# Patient Record
Sex: FEMALE | Race: WHITE | Employment: FULL TIME | URBAN - METROPOLITAN AREA
[De-identification: names, ages, dates, MRNs, and addresses within clinical notes are randomized per-mention and may not be internally consistent; named-entity substitution may affect disease eponyms.]

---

## 2018-10-04 ENCOUNTER — TELEPHONE (OUTPATIENT)
Dept: PHYSICAL MEDICINE AND REHAB | Age: 58
End: 2018-10-04

## 2018-10-04 NOTE — TELEPHONE ENCOUNTER
Call placed to schedule EMG RUE re: CTS (Dr Jo Toro). A message was left for return call. Referral scanned to Media.

## 2018-10-18 ENCOUNTER — OFFICE VISIT (OUTPATIENT)
Dept: PHYSICAL MEDICINE AND REHAB | Age: 58
End: 2018-10-18
Payer: COMMERCIAL

## 2018-10-18 VITALS — BODY MASS INDEX: 23.78 KG/M2 | HEIGHT: 66 IN | WEIGHT: 148 LBS

## 2018-10-18 DIAGNOSIS — G56.01 RIGHT CARPAL TUNNEL SYNDROME: Primary | ICD-10-CM

## 2018-10-18 PROCEDURE — 99202 OFFICE O/P NEW SF 15 MIN: CPT | Performed by: PHYSICAL MEDICINE & REHABILITATION

## 2018-10-18 PROCEDURE — 95909 NRV CNDJ TST 5-6 STUDIES: CPT | Performed by: PHYSICAL MEDICINE & REHABILITATION

## 2018-10-18 PROCEDURE — 95886 MUSC TEST DONE W/N TEST COMP: CPT | Performed by: PHYSICAL MEDICINE & REHABILITATION

## 2018-10-18 NOTE — PROGRESS NOTES
Date of Examination: 10/18/18  Patient Name: Mamie Avalos  is a 59y.o. year old female who was seen due to complaints of   Chief Complaint   Patient presents with    Wrist Pain     Right wrist pain     Hand Numbness     Right hand numbness     Extremity Weakness     Right hand weakness    that has been present for about one month and started after no injury. Physical Exam: MSK: There is no joint effusion, deformity, instability, swelling, erythema or warmth. AROM is full in the spine and extremities. + Tinel right wrist. Neurologic:  No focal sensorimotor deficit. Reflexes 2+ and symmetric. Gait is normal.    Impression:   1. Right carpal tunnel syndrome        Plan:   · EMG is indicated to evaluate the above diagnosis. Orders Placed This Encounter   Procedures    EMG     Standing Status:   Future     Standing Expiration Date:   10/18/2019     Order Specific Question:   Which body part? Answer:   RUE     · EMG was done today and showed right carpal tunnel syndrome. The patient was educated about the diagnosis and the prognosis. · Recommend neutral wrist splints at h.s., OT and/or carpal tunnel injection and if no improvement after 4-6 weeks of conservative treatments consider orthopedic surgery evaluation. · Advised patient to follow up with referring provider. Thank you for allowing me to participate in the care of your patient.       Sincerely,     Cony Armenta

## 2018-10-18 NOTE — PROGRESS NOTES
[address]  Electrodiagnostic Laboratory  *Accredited by the [facility] with exemplary status  [address] [facility]  Phone: [phone]  Fax: [phone]      Referring Provider: Georgina Cotton DO  Primary Care Physician: Brynn William DO  Patient Name: Yvonne Leos  Patient YOB: 1960  Gender: female  BMI: Body mass index is 24.25 kg/m². Height 5' 5.5\" (1.664 m), weight 148 lb (67.1 kg). 2  10/18/2018    Description of clinical problem:   Chief Complaint   Patient presents with    Wrist Pain     Right wrist pain     Hand Numbness     Right hand numbness     Extremity Weakness     Right hand weakness     Pain Yes  Pain Score:   6; Numbness/tingling  Yes; Weakness  Yes       Special considerations:   Pacemaker/Defibrillator No; Anticoagulation or Antiplatelet No; Mestinon No; botulinum Toxin  No       Pertinent history:  Diabetes  No; Thyroid disease No; Alcohol abuse No; Family history of neuromuscular disease No; Pertinent surgical history No    Allergies: Adhesive: No; Isopropyl alcohol: No       Brief physical exam:   Sensory deficit No; Weakness No; Atrophy  No; Reflex abnormality No    Consent: The patient was advised of the indications, risks, benefits and alternatives to nerve conduction studies and electromyography and agreed to proceed. Sensory NCS      Nerve / Sites Rec. Site Peak Lat PP Amp Segments Distance Velocity Temp.      ms µV  cm m/s °C   R Median - Digit II (Antidromic)      Palm Dig II 2.40 19.5 Palm - Dig II 7 43 33.2      Wrist Dig II 4.79* 18.2 Wrist - Dig II 14 36 33.2   R Ulnar - Digit V (Antidromic)      Wrist Dig V 3.65 20.6 Wrist - Dig V 14 50 33.1   R Radial - Anatomical snuff box (Forearm)      Forearm Wrist 2.34 22.3 Forearm - Wrist 10 58 33.2       Motor NCS      Nerve / Sites Muscle Onset Amplitude Segments Distance Velocity Temp.     ms mV  cm m/s °C   R Median - APB      Palm APB 1.56 5.3 Palm - APB Latency (ms)   Median motor APB  All ages  Men Age23 to 44  Men Age 36 to 52  Men Age 48 to 61  Men Age 61 to 71  Men age 79 to 78  Women Age 23 to 44  Women Age 36 to 52  Women Age 48 to 61  Women Age 61 to 71  Women Age 79 to 78   4.1  5.9  4.2   4.2   3.8  3.8  5.9  4.2  4.2  3.8  3.8   49  49  47  47  47  47  53  51  51  51  51   4.5  4.6  4.6  4.7  4.7  4.7  4.4  4.4  4.4  4.4  4.4   Ulnar motor ADM  All ages  Below elbow  Across elbow  Above elbow  CV drop across elbow  % CV drop across elbow   7.9     52  43  50  15 m/s  23%   3.7   Fibular (peroneal) motor EDB  All ages  Age 23 to 44 <170 cm tall   Age 23 to 44 >170 cm tall  Age 36 to 78 <170 cm tall  Age 36 to 78 >170 cm tall  CV across fibular head  CV drop across fibular head  % CV drop across fibular head  % amplitude drop ankle to below fibula  % amplitude drop across fibular head   1.3  2.6  2.6  1.1  1.1        32%  25%   38  43  37  39  36  42  6m/s  12%     6.5  6.5  6.5  6.5  6.5   Tibial motor AH  All ages  Age 23 to 34 <160 cm tall  Age 30-49 <160 cm tall  Age 48 to 61 <160 cm tall  Age 61 to 78 <160 cm tall  Age 23 to 34, 160-170 cm tall  Age 30-49 160-170 cm tall  Age 48 to 61 160-170 cm tall  Age 61 to 78 160-170 cm tall  Age 23 to 34 >/=170 cm tall  Age 30-49 >/=170 cm tall  Age 48 to 61 >/=170 cm tall  Age 61 to 78 >/=170 cm tall  Amplitude drop from ankle to knee  % amplitude drop ankle to knee   4.4  5.8  5.3  5.3  1.1  5.8  5.3  5.3  1.1  5.8  5.3  5.3  1.1  10.3  71%   39  44  44  40  40  42  42  34  34  37  37  34  34   6.1  6.1  6.1  6.1  6.1  6.1  6.1  6.1  6.1  6.1  6.1  6.1  6.1     Ulnar motor (FDI)  Age <9  Age 7-34  Age 35-46  Age 52-63  Age >57   >8  >8  >7  >7  >7   >51  >51  >50  >50  >50   <3.8  <3.8  <4.3  <4.5  <4.5     Radial motor (EDC)  Age <9  Age 7-34  Age 35-46  Age 52-63  Age >57   >6  >6  >6  >5  >5   >47  >51  >50  >50  >50   <3.0  <3.0  <3.1  <3.1  <3.1   Musculocutaneous motor (Biceps)   Age <9  Age 7-34  Age

## 2018-10-23 DIAGNOSIS — G56.01 RIGHT CARPAL TUNNEL SYNDROME: ICD-10-CM

## 2022-05-20 ENCOUNTER — APPOINTMENT (OUTPATIENT)
Dept: GENERAL RADIOLOGY | Age: 62
End: 2022-05-20
Payer: COMMERCIAL

## 2022-05-20 ENCOUNTER — APPOINTMENT (OUTPATIENT)
Dept: CT IMAGING | Age: 62
End: 2022-05-20
Payer: COMMERCIAL

## 2022-05-20 ENCOUNTER — HOSPITAL ENCOUNTER (EMERGENCY)
Age: 62
Discharge: HOME | End: 2022-05-20
Attending: EMERGENCY MEDICINE
Payer: COMMERCIAL

## 2022-05-20 VITALS
WEIGHT: 148 LBS | DIASTOLIC BLOOD PRESSURE: 82 MMHG | BODY MASS INDEX: 23.78 KG/M2 | TEMPERATURE: 97.1 F | SYSTOLIC BLOOD PRESSURE: 136 MMHG | RESPIRATION RATE: 16 BRPM | HEIGHT: 66 IN | HEART RATE: 82 BPM | OXYGEN SATURATION: 98 %

## 2022-05-20 DIAGNOSIS — S82.141A CLOSED FRACTURE OF RIGHT TIBIAL PLATEAU, INITIAL ENCOUNTER: Primary | ICD-10-CM

## 2022-05-20 PROCEDURE — 70450 CT HEAD/BRAIN W/O DYE: CPT

## 2022-05-20 PROCEDURE — 73502 X-RAY EXAM HIP UNI 2-3 VIEWS: CPT

## 2022-05-20 PROCEDURE — 73552 X-RAY EXAM OF FEMUR 2/>: CPT

## 2022-05-20 PROCEDURE — 99284 EMERGENCY DEPT VISIT MOD MDM: CPT

## 2022-05-20 PROCEDURE — 6370000000 HC RX 637 (ALT 250 FOR IP): Performed by: EMERGENCY MEDICINE

## 2022-05-20 PROCEDURE — 73590 X-RAY EXAM OF LOWER LEG: CPT

## 2022-05-20 PROCEDURE — 73560 X-RAY EXAM OF KNEE 1 OR 2: CPT

## 2022-05-20 PROCEDURE — 72125 CT NECK SPINE W/O DYE: CPT

## 2022-05-20 RX ORDER — VENLAFAXINE HYDROCHLORIDE 150 MG/1
150 CAPSULE, EXTENDED RELEASE ORAL NIGHTLY
COMMUNITY

## 2022-05-20 RX ORDER — M-VIT,TX,IRON,MINS/CALC/FOLIC 27MG-0.4MG
2 TABLET ORAL 2 TIMES DAILY
COMMUNITY

## 2022-05-20 RX ORDER — OXYCODONE HYDROCHLORIDE AND ACETAMINOPHEN 5; 325 MG/1; MG/1
1 TABLET ORAL EVERY 6 HOURS PRN
Qty: 15 TABLET | Refills: 0 | Status: SHIPPED | OUTPATIENT
Start: 2022-05-20 | End: 2022-05-24

## 2022-05-20 RX ORDER — OXYCODONE HYDROCHLORIDE AND ACETAMINOPHEN 5; 325 MG/1; MG/1
1 TABLET ORAL ONCE
Status: COMPLETED | OUTPATIENT
Start: 2022-05-20 | End: 2022-05-20

## 2022-05-20 RX ADMIN — OXYCODONE HYDROCHLORIDE AND ACETAMINOPHEN 1 TABLET: 5; 325 TABLET ORAL at 14:53

## 2022-05-20 ASSESSMENT — PAIN SCALES - GENERAL
PAINLEVEL_OUTOF10: 3
PAINLEVEL_OUTOF10: 5

## 2022-05-20 ASSESSMENT — PAIN DESCRIPTION - LOCATION: LOCATION: LEG

## 2022-05-20 ASSESSMENT — ENCOUNTER SYMPTOMS: ABDOMINAL PAIN: 0

## 2022-05-20 ASSESSMENT — PAIN DESCRIPTION - ORIENTATION: ORIENTATION: RIGHT

## 2022-05-20 ASSESSMENT — PAIN DESCRIPTION - DESCRIPTORS: DESCRIPTORS: ACHING

## 2022-05-20 ASSESSMENT — PAIN - FUNCTIONAL ASSESSMENT: PAIN_FUNCTIONAL_ASSESSMENT: 0-10
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
## 2022-05-20 NOTE — ED NOTES
Abrasion to the right lower leg laterally. no bleeding. full ROM of the right leg.      Corrie Winston RN  05/20/22 [id number]

## 2022-05-26 ENCOUNTER — TELEPHONE (OUTPATIENT)
Dept: ORTHOPEDIC SURGERY | Age: 62
End: 2022-05-26

## 2022-05-26 ENCOUNTER — OFFICE VISIT (OUTPATIENT)
Dept: ORTHOPEDIC SURGERY | Age: 62
End: 2022-05-26
Payer: COMMERCIAL

## 2022-05-26 VITALS — BODY MASS INDEX: 24.99 KG/M2 | HEIGHT: 65 IN | WEIGHT: 150 LBS | TEMPERATURE: 98 F

## 2022-05-26 DIAGNOSIS — S82.143A TIBIAL PLATEAU FRACTURE, UNSPECIFIED LATERALITY, CLOSED, INITIAL ENCOUNTER: Primary | ICD-10-CM

## 2022-05-26 PROCEDURE — 99203 OFFICE O/P NEW LOW 30 MIN: CPT | Performed by: ORTHOPAEDIC SURGERY
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
## 2022-05-27 DIAGNOSIS — S82.143A TIBIAL PLATEAU FRACTURE, UNSPECIFIED LATERALITY, CLOSED, INITIAL ENCOUNTER: Primary | ICD-10-CM

## 2022-05-28 SDOH — HEALTH STABILITY: PHYSICAL HEALTH: ON AVERAGE, HOW MANY DAYS PER WEEK DO YOU ENGAGE IN MODERATE TO STRENUOUS EXERCISE (LIKE A BRISK WALK)?: 6 DAYS

## 2022-05-28 SDOH — HEALTH STABILITY: PHYSICAL HEALTH: ON AVERAGE, HOW MANY MINUTES DO YOU ENGAGE IN EXERCISE AT THIS LEVEL?: 40 MIN

## 2022-05-31 ENCOUNTER — HOSPITAL ENCOUNTER (OUTPATIENT)
Dept: GENERAL RADIOLOGY | Age: 62
Discharge: HOME | End: 2022-06-02
Payer: COMMERCIAL

## 2022-05-31 ENCOUNTER — TELEPHONE (OUTPATIENT)
Dept: ORTHOPEDIC SURGERY | Age: 62
End: 2022-05-31

## 2022-05-31 ENCOUNTER — OFFICE VISIT (OUTPATIENT)
Dept: ORTHOPEDIC SURGERY | Age: 62
End: 2022-05-31
Payer: COMMERCIAL

## 2022-05-31 ENCOUNTER — PREP FOR PROCEDURE (OUTPATIENT)
Dept: ORTHOPEDIC SURGERY | Age: 62
End: 2022-05-31

## 2022-05-31 VITALS — WEIGHT: 150 LBS | HEIGHT: 65 IN | BODY MASS INDEX: 24.99 KG/M2

## 2022-05-31 DIAGNOSIS — S82.143A TIBIAL PLATEAU FRACTURE, UNSPECIFIED LATERALITY, CLOSED, INITIAL ENCOUNTER: ICD-10-CM

## 2022-05-31 DIAGNOSIS — S82.121A CLOSED FRACTURE OF LATERAL PORTION OF RIGHT TIBIAL PLATEAU, INITIAL ENCOUNTER: ICD-10-CM

## 2022-05-31 PROCEDURE — 73560 X-RAY EXAM OF KNEE 1 OR 2: CPT

## 2022-05-31 PROCEDURE — 99203 OFFICE O/P NEW LOW 30 MIN: CPT

## 2022-05-31 PROCEDURE — 99204 OFFICE O/P NEW MOD 45 MIN: CPT | Performed by: ORTHOPAEDIC SURGERY

## 2022-05-31 RX ORDER — SODIUM CHLORIDE 0.9 % (FLUSH) 0.9 %
5-40 SYRINGE (ML) INJECTION PRN
Status: CANCELLED | OUTPATIENT
Start: 2022-05-31

## 2022-05-31 RX ORDER — SODIUM CHLORIDE 9 MG/ML
INJECTION, SOLUTION INTRAVENOUS PRN
Status: CANCELLED | OUTPATIENT
Start: 2022-05-31

## 2022-05-31 RX ORDER — SODIUM CHLORIDE 0.9 % (FLUSH) 0.9 %
5-40 SYRINGE (ML) INJECTION EVERY 12 HOURS SCHEDULED
Status: CANCELLED | OUTPATIENT
Start: 2022-05-31
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
## 2022-05-31 NOTE — PATIENT INSTRUCTIONS
body from now on. The doctor closes the incision with stitches. You will have a scar, but it will fade with time. You may spend from a few hours to a few days in the hospital. This depends on how serious your injury is. It usually takes 6 to 12 weeks for a broken bone to heal.    How soon you can go back to work and your normal routine depends on your job. It also depends on how long it takes your bone to heal. For example, if you have a broken leg and you sit at work, you may be able to go back in 1 to 2 weeks. But if your job requires you to walk or stand a lot, you may need to wait until your bone has healed.

## 2022-05-31 NOTE — TELEPHONE ENCOUNTER
Prior Authorization Form:    DEMOGRAPHICS:                     Patient Name:  Shira Vicente  Patient :  1960            Insurance:  Payor: [employer] / Plan: [address] [place] [address] / Product Type: *No Product type* /   Insurance ID Number:    Payor/Plan Subscr  Sex Relation Sub. Ins. ID Effective Group Num   1.  [address] A 1960 Female Self [id number]* [id number]                                   [id number]       [] Prior authorization obtained   [x] NO PRIOR AUTH REQUIRED     DIAGNOSIS & PROCEDURE:                       Procedure/Operation: right tibial plateau fracture ORIF, possible bone allograft         CPT Code: 23511    Diagnosis:  Right tibial plateau fracture    BNJ01 Code: S82.121A    Location:  [place], [place]    Surgeon:  Bk Moore DO    SCHEDULING INFORMATION:                          Date: 22    Time:     1700        Anesthesia:  General and Regional Block                                                       Status:  OUTPATIENT WITH OBSERVATION     Special Comments:  N/A    Vendor: [employer]  []   Notified     Length of Surgery (with 30 min clean up time): 1.5 hours    Medical clearance: No Medical Clearance Needed    Pre-Op Labs:       []  Orders Placed    Electronically signed by Jeaneth Mena RN on 2022 at 10:29 AM
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
## 2022-05-31 NOTE — TELEPHONE ENCOUNTER
Call placed to [facility]. Spoke to Jd Ho RN. Surgery originally placed as inpatient. Discussed with Jd Ho, she advised prior authorization is NOT REQUIRED if listed as ambulatory or observation. Changed status of surgery to observation at this time. If patient requires further stay after observation, hospital can contact insurance to provide information for additional hospital time. Previous prior authorization closed.      [employer]. [id number]  Fx. [phone]    Ref # [id number]

## 2022-05-31 NOTE — PROGRESS NOTES
Medication information sheet(s)   [] Fluoroscopy-Xray used in surgery reviewed with patient. Educational pamphlet placed in chart. [x] Pain: Post-op pain is normal and to be expected. You will be asked to rate your pain from 0-10. [x] Ask your nurse for your pain medication. [] Joint camp offered. [] Joint replacement booklets given.  [] Spine Navigator to see in PAT. [] Other:___________________________    MEDICATION INSTRUCTIONS:    [x] Bring a complete list of your medications, please write the last time you took the medicine, give this list to the nurse. [x] Take the following medications the morning of surgery with 1-2 ounces of water: None. [x] Stop herbal supplements and vitamins 5 days before your surgery. [] DO NOT take any diabetic medicine the morning of surgery. Follow instructions for insulin the day before surgery. [] If you are diabetic and your blood sugar is low or you feel symptomatic, you may drink 1-2 ounces of apple juice or take a glucose tablet.            -The morning of your procedure, you may call the pre-op area if you have concerns about your blood sugar [phone]. [] Use your inhalers the morning of surgery. Bring your emergency inhaler with you day of surgery. [x] Follow physician instructions regarding any blood thinners you may be taking. WHAT TO EXPECT:    [x] The day of surgery you will be greeted and checked in by the [facility].  In addition, you will be registered in the [facility] by a Patient Access Representative. Please bring your photo ID and insurance card. A nurse will greet you in accordance to the time you are needed in the pre-op area to prepare you for surgery. Please do not be discouraged if you are not greeted in the order you arrive as there are many variables that are involved in patient preparation. Your patience is greatly appreciated as you wait for your nurse.   Please bring in items such as: books, magazines, newspapers, electronics, or any other items  to occupy your time in the waiting area. [x]  Delays may occur with surgery and staff will make a sincere effort to keep you informed of delays. If any delays occur with your procedure, we apologize ahead of time for your inconvenience as we recognize the value of your time.
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
## 2022-06-01 ENCOUNTER — HOSPITAL ENCOUNTER (OUTPATIENT)
Age: 62
Setting detail: OUTPATIENT SURGERY
Discharge: HOME | End: 2022-06-01
Attending: ORTHOPAEDIC SURGERY | Admitting: ORTHOPAEDIC SURGERY
Payer: COMMERCIAL

## 2022-06-01 ENCOUNTER — ANESTHESIA (OUTPATIENT)
Dept: OPERATING ROOM | Age: 62
End: 2022-06-01
Payer: COMMERCIAL

## 2022-06-01 ENCOUNTER — APPOINTMENT (OUTPATIENT)
Dept: GENERAL RADIOLOGY | Age: 62
End: 2022-06-01
Attending: ORTHOPAEDIC SURGERY
Payer: COMMERCIAL

## 2022-06-01 ENCOUNTER — ANESTHESIA EVENT (OUTPATIENT)
Dept: OPERATING ROOM | Age: 62
End: 2022-06-01
Payer: COMMERCIAL

## 2022-06-01 ENCOUNTER — TELEPHONE (OUTPATIENT)
Dept: ORTHOPEDIC SURGERY | Age: 62
End: 2022-06-01

## 2022-06-01 VITALS
HEIGHT: 65 IN | TEMPERATURE: 98.9 F | BODY MASS INDEX: 24.99 KG/M2 | HEART RATE: 111 BPM | WEIGHT: 150 LBS | OXYGEN SATURATION: 95 % | RESPIRATION RATE: 20 BRPM | SYSTOLIC BLOOD PRESSURE: 115 MMHG | DIASTOLIC BLOOD PRESSURE: 66 MMHG

## 2022-06-01 DIAGNOSIS — S82.121A CLOSED FRACTURE OF LATERAL PORTION OF RIGHT TIBIAL PLATEAU, INITIAL ENCOUNTER: Primary | ICD-10-CM

## 2022-06-01 LAB
ALBUMIN SERPL-MCNC: 4.8 G/DL (ref 3.5–5.2)
ALP BLD-CCNC: 115 U/L (ref 35–104)
ALT SERPL-CCNC: 14 U/L (ref 0–32)
ANION GAP SERPL CALCULATED.3IONS-SCNC: 10 MMOL/L (ref 7–16)
AST SERPL-CCNC: 17 U/L (ref 0–31)
BASOPHILS ABSOLUTE: 0.03 E9/L (ref 0–0.2)
BASOPHILS RELATIVE PERCENT: 0.6 % (ref 0–2)
BILIRUB SERPL-MCNC: 0.3 MG/DL (ref 0–1.2)
BUN BLDV-MCNC: 15 MG/DL (ref 6–23)
CALCIUM SERPL-MCNC: 9.4 MG/DL (ref 8.6–10.2)
CHLORIDE BLD-SCNC: 102 MMOL/L (ref 98–107)
CO2: 26 MMOL/L (ref 22–29)
CREAT SERPL-MCNC: 0.9 MG/DL (ref 0.5–1)
EOSINOPHILS ABSOLUTE: 0.1 E9/L (ref 0.05–0.5)
EOSINOPHILS RELATIVE PERCENT: 2 % (ref 0–6)
GFR AFRICAN AMERICAN: >60
GFR NON-AFRICAN AMERICAN: >60 ML/MIN/1.73
GLUCOSE BLD-MCNC: 109 MG/DL (ref 74–99)
HCT VFR BLD CALC: 39.9 % (ref 34–48)
HEMOGLOBIN: 13.4 G/DL (ref 11.5–15.5)
IMMATURE GRANULOCYTES #: 0.01 E9/L
IMMATURE GRANULOCYTES %: 0.2 % (ref 0–5)
LYMPHOCYTES ABSOLUTE: 1.13 E9/L (ref 1.5–4)
LYMPHOCYTES RELATIVE PERCENT: 23.2 % (ref 20–42)
MCH RBC QN AUTO: 28.7 PG (ref 26–35)
MCHC RBC AUTO-ENTMCNC: 33.6 % (ref 32–34.5)
MCV RBC AUTO: 85.4 FL (ref 80–99.9)
MONOCYTES ABSOLUTE: 0.63 E9/L (ref 0.1–0.95)
MONOCYTES RELATIVE PERCENT: 12.9 % (ref 2–12)
NEUTROPHILS ABSOLUTE: 2.98 E9/L (ref 1.8–7.3)
NEUTROPHILS RELATIVE PERCENT: 61.1 % (ref 43–80)
PDW BLD-RTO: 11.9 FL (ref 11.5–15)
PLATELET # BLD: 279 E9/L (ref 130–450)
PMV BLD AUTO: 9.3 FL (ref 7–12)
POTASSIUM REFLEX MAGNESIUM: 4 MMOL/L (ref 3.5–5)
RBC # BLD: 4.67 E12/L (ref 3.5–5.5)
SODIUM BLD-SCNC: 138 MMOL/L (ref 132–146)
TOTAL PROTEIN: 7.6 G/DL (ref 6.4–8.3)
WBC # BLD: 4.9 E9/L (ref 4.5–11.5)

## 2022-06-01 PROCEDURE — 73590 X-RAY EXAM OF LOWER LEG: CPT

## 2022-06-01 PROCEDURE — 2500000003 HC RX 250 WO HCPCS: Performed by: NURSE ANESTHETIST, CERTIFIED REGISTERED

## 2022-06-01 PROCEDURE — 3700000000 HC ANESTHESIA ATTENDED CARE: Performed by: ORTHOPAEDIC SURGERY

## 2022-06-01 PROCEDURE — 7100000000 HC PACU RECOVERY - FIRST 15 MIN: Performed by: ORTHOPAEDIC SURGERY

## 2022-06-01 PROCEDURE — 6360000002 HC RX W HCPCS: Performed by: NURSE ANESTHETIST, CERTIFIED REGISTERED

## 2022-06-01 PROCEDURE — 6360000002 HC RX W HCPCS: Performed by: ORTHOPAEDIC SURGERY

## 2022-06-01 PROCEDURE — 85025 COMPLETE CBC W/AUTO DIFF WBC: CPT

## 2022-06-01 PROCEDURE — 3600000015 HC SURGERY LEVEL 5 ADDTL 15MIN: Performed by: ORTHOPAEDIC SURGERY

## 2022-06-01 PROCEDURE — 6360000002 HC RX W HCPCS: Performed by: ANESTHESIOLOGY

## 2022-06-01 PROCEDURE — 6370000000 HC RX 637 (ALT 250 FOR IP): Performed by: ORTHOPAEDIC SURGERY

## 2022-06-01 PROCEDURE — 80053 COMPREHEN METABOLIC PANEL: CPT

## 2022-06-01 PROCEDURE — 2709999900 HC NON-CHARGEABLE SUPPLY: Performed by: ORTHOPAEDIC SURGERY

## 2022-06-01 PROCEDURE — 6360000002 HC RX W HCPCS

## 2022-06-01 PROCEDURE — 2580000003 HC RX 258: Performed by: PHYSICIAN ASSISTANT

## 2022-06-01 PROCEDURE — 6360000002 HC RX W HCPCS: Performed by: PHYSICIAN ASSISTANT

## 2022-06-01 PROCEDURE — 2500000003 HC RX 250 WO HCPCS

## 2022-06-01 PROCEDURE — L1830 KO IMMOB CANVAS LONG PRE OTS: HCPCS | Performed by: ORTHOPAEDIC SURGERY

## 2022-06-01 PROCEDURE — 7100000011 HC PHASE II RECOVERY - ADDTL 15 MIN: Performed by: ORTHOPAEDIC SURGERY

## 2022-06-01 PROCEDURE — 3209999900 FLUORO FOR SURGICAL PROCEDURES

## 2022-06-01 PROCEDURE — 6370000000 HC RX 637 (ALT 250 FOR IP): Performed by: ANESTHESIOLOGY

## 2022-06-01 PROCEDURE — 2720000010 HC SURG SUPPLY STERILE: Performed by: ORTHOPAEDIC SURGERY

## 2022-06-01 PROCEDURE — 3700000001 HC ADD 15 MINUTES (ANESTHESIA): Performed by: ORTHOPAEDIC SURGERY

## 2022-06-01 PROCEDURE — 27301 DRAIN THIGH/KNEE LESION: CPT | Performed by: ORTHOPAEDIC SURGERY

## 2022-06-01 PROCEDURE — 27535 TREAT KNEE FRACTURE: CPT | Performed by: ORTHOPAEDIC SURGERY

## 2022-06-01 PROCEDURE — 7100000010 HC PHASE II RECOVERY - FIRST 15 MIN: Performed by: ORTHOPAEDIC SURGERY

## 2022-06-01 PROCEDURE — C1713 ANCHOR/SCREW BN/BN,TIS/BN: HCPCS | Performed by: ORTHOPAEDIC SURGERY

## 2022-06-01 PROCEDURE — 73560 X-RAY EXAM OF KNEE 1 OR 2: CPT

## 2022-06-01 PROCEDURE — 36415 COLL VENOUS BLD VENIPUNCTURE: CPT

## 2022-06-01 PROCEDURE — 2580000003 HC RX 258: Performed by: NURSE ANESTHETIST, CERTIFIED REGISTERED

## 2022-06-01 PROCEDURE — 3600000005 HC SURGERY LEVEL 5 BASE: Performed by: ORTHOPAEDIC SURGERY

## 2022-06-01 PROCEDURE — 7100000001 HC PACU RECOVERY - ADDTL 15 MIN: Performed by: ORTHOPAEDIC SURGERY

## 2022-06-01 DEVICE — SCREW BNE L36MM DIA3.5MM CORT S STL ST NONCANNULATED LOK: Type: IMPLANTABLE DEVICE | Site: TIBIA | Status: FUNCTIONAL

## 2022-06-01 DEVICE — SCREW BNE L60MM DIA3.5MM CORT S STL ST LOK FULL THRD: Type: IMPLANTABLE DEVICE | Site: TIBIA | Status: FUNCTIONAL

## 2022-06-01 DEVICE — SCREW BNE L75MM DIA3.5MM CORT S STL ST LOK FULL THRD T15: Type: IMPLANTABLE DEVICE | Site: TIBIA | Status: FUNCTIONAL

## 2022-06-01 DEVICE — GRAFT BNE SUB 15ML 1.7-10MM CANC CHIP MORSELIZED FRZ DRY: Type: IMPLANTABLE DEVICE | Site: TIBIA | Status: FUNCTIONAL

## 2022-06-01 DEVICE — PLATE BNE L102MM 6 H ST R PROX BILAT TIB S STL NEUT LOK: Type: IMPLANTABLE DEVICE | Site: TIBIA | Status: FUNCTIONAL

## 2022-06-01 DEVICE — SCREW BNE L40MM DIA3.5MM CORT S STL ST NONCANNULATED LOK: Type: IMPLANTABLE DEVICE | Site: TIBIA | Status: FUNCTIONAL

## 2022-06-01 DEVICE — SCREW BNE L70MM DIA3.5MM CORT S STL ST LOK FULL THRD: Type: IMPLANTABLE DEVICE | Site: TIBIA | Status: FUNCTIONAL

## 2022-06-01 RX ORDER — SODIUM CHLORIDE 0.9 % (FLUSH) 0.9 %
5-40 SYRINGE (ML) INJECTION PRN
Status: DISCONTINUED | OUTPATIENT
Start: 2022-06-01 | End: 2022-06-01 | Stop reason: HOSPADM

## 2022-06-01 RX ORDER — GLYCOPYRROLATE 1 MG/5 ML
SYRINGE (ML) INTRAVENOUS PRN
Status: DISCONTINUED | OUTPATIENT
Start: 2022-06-01 | End: 2022-06-01 | Stop reason: SDUPTHER

## 2022-06-01 RX ORDER — NEOSTIGMINE METHYLSULFATE 1 MG/ML
INJECTION, SOLUTION INTRAVENOUS PRN
Status: DISCONTINUED | OUTPATIENT
Start: 2022-06-01 | End: 2022-06-01 | Stop reason: SDUPTHER

## 2022-06-01 RX ORDER — LABETALOL HYDROCHLORIDE 5 MG/ML
INJECTION, SOLUTION INTRAVENOUS PRN
Status: DISCONTINUED | OUTPATIENT
Start: 2022-06-01 | End: 2022-06-01 | Stop reason: SDUPTHER

## 2022-06-01 RX ORDER — ONDANSETRON 2 MG/ML
4 INJECTION INTRAMUSCULAR; INTRAVENOUS
Status: DISCONTINUED | OUTPATIENT
Start: 2022-06-01 | End: 2022-06-01 | Stop reason: HOSPADM

## 2022-06-01 RX ORDER — MIDAZOLAM HYDROCHLORIDE 1 MG/ML
INJECTION INTRAMUSCULAR; INTRAVENOUS PRN
Status: DISCONTINUED | OUTPATIENT
Start: 2022-06-01 | End: 2022-06-01 | Stop reason: SDUPTHER

## 2022-06-01 RX ORDER — LIDOCAINE HYDROCHLORIDE 20 MG/ML
INJECTION, SOLUTION INTRAVENOUS PRN
Status: DISCONTINUED | OUTPATIENT
Start: 2022-06-01 | End: 2022-06-01 | Stop reason: SDUPTHER

## 2022-06-01 RX ORDER — SODIUM CHLORIDE 0.9 % (FLUSH) 0.9 %
5-40 SYRINGE (ML) INJECTION EVERY 12 HOURS SCHEDULED
Status: DISCONTINUED | OUTPATIENT
Start: 2022-06-01 | End: 2022-06-01 | Stop reason: HOSPADM

## 2022-06-01 RX ORDER — OXYCODONE HYDROCHLORIDE AND ACETAMINOPHEN 5; 325 MG/1; MG/1
1 TABLET ORAL EVERY 6 HOURS PRN
Qty: 28 TABLET | Refills: 0 | Status: SHIPPED | OUTPATIENT
Start: 2022-06-01 | End: 2022-06-08

## 2022-06-01 RX ORDER — MEPERIDINE HYDROCHLORIDE 25 MG/ML
12.5 INJECTION INTRAMUSCULAR; INTRAVENOUS; SUBCUTANEOUS EVERY 5 MIN PRN
Status: DISCONTINUED | OUTPATIENT
Start: 2022-06-01 | End: 2022-06-02 | Stop reason: HOSPADM

## 2022-06-01 RX ORDER — HYDRALAZINE HYDROCHLORIDE 20 MG/ML
INJECTION INTRAMUSCULAR; INTRAVENOUS PRN
Status: DISCONTINUED | OUTPATIENT
Start: 2022-06-01 | End: 2022-06-01 | Stop reason: SDUPTHER

## 2022-06-01 RX ORDER — KETOROLAC TROMETHAMINE 30 MG/ML
30 INJECTION, SOLUTION INTRAMUSCULAR; INTRAVENOUS
Status: COMPLETED | OUTPATIENT
Start: 2022-06-01 | End: 2022-06-01

## 2022-06-01 RX ORDER — CEPHALEXIN 500 MG/1
500 CAPSULE ORAL 3 TIMES DAILY
Qty: 30 CAPSULE | Refills: 0 | Status: SHIPPED | OUTPATIENT
Start: 2022-06-01 | End: 2022-06-11

## 2022-06-01 RX ORDER — SODIUM CHLORIDE 9 MG/ML
INJECTION, SOLUTION INTRAVENOUS PRN
Status: DISCONTINUED | OUTPATIENT
Start: 2022-06-01 | End: 2022-06-02 | Stop reason: HOSPADM

## 2022-06-01 RX ORDER — ONDANSETRON 2 MG/ML
INJECTION INTRAMUSCULAR; INTRAVENOUS PRN
Status: DISCONTINUED | OUTPATIENT
Start: 2022-06-01 | End: 2022-06-01 | Stop reason: SDUPTHER

## 2022-06-01 RX ORDER — ASPIRIN 81 MG/1
81 TABLET ORAL 2 TIMES DAILY
Qty: 30 TABLET | Refills: 0 | Status: SHIPPED | OUTPATIENT
Start: 2022-06-01 | End: 2022-09-22

## 2022-06-01 RX ORDER — VANCOMYCIN HYDROCHLORIDE 1 G/20ML
INJECTION, POWDER, LYOPHILIZED, FOR SOLUTION INTRAVENOUS PRN
Status: DISCONTINUED | OUTPATIENT
Start: 2022-06-01 | End: 2022-06-01 | Stop reason: ALTCHOICE

## 2022-06-01 RX ORDER — SODIUM CHLORIDE 0.9 % (FLUSH) 0.9 %
5-40 SYRINGE (ML) INJECTION PRN
Status: DISCONTINUED | OUTPATIENT
Start: 2022-06-01 | End: 2022-06-02 | Stop reason: HOSPADM

## 2022-06-01 RX ORDER — ROCURONIUM BROMIDE 10 MG/ML
INJECTION, SOLUTION INTRAVENOUS PRN
Status: DISCONTINUED | OUTPATIENT
Start: 2022-06-01 | End: 2022-06-01 | Stop reason: SDUPTHER

## 2022-06-01 RX ORDER — SODIUM CHLORIDE 9 MG/ML
INJECTION, SOLUTION INTRAVENOUS PRN
Status: DISCONTINUED | OUTPATIENT
Start: 2022-06-01 | End: 2022-06-01 | Stop reason: HOSPADM

## 2022-06-01 RX ORDER — FENTANYL CITRATE 50 UG/ML
INJECTION, SOLUTION INTRAMUSCULAR; INTRAVENOUS PRN
Status: DISCONTINUED | OUTPATIENT
Start: 2022-06-01 | End: 2022-06-01 | Stop reason: SDUPTHER

## 2022-06-01 RX ORDER — DEXAMETHASONE SODIUM PHOSPHATE 10 MG/ML
INJECTION INTRAMUSCULAR; INTRAVENOUS PRN
Status: DISCONTINUED | OUTPATIENT
Start: 2022-06-01 | End: 2022-06-01 | Stop reason: SDUPTHER

## 2022-06-01 RX ORDER — PROPOFOL 10 MG/ML
INJECTION, EMULSION INTRAVENOUS PRN
Status: DISCONTINUED | OUTPATIENT
Start: 2022-06-01 | End: 2022-06-01 | Stop reason: SDUPTHER

## 2022-06-01 RX ORDER — OXYCODONE HYDROCHLORIDE AND ACETAMINOPHEN 5; 325 MG/1; MG/1
1 TABLET ORAL
Status: COMPLETED | OUTPATIENT
Start: 2022-06-01 | End: 2022-06-01

## 2022-06-01 RX ORDER — SODIUM CHLORIDE 9 MG/ML
INJECTION, SOLUTION INTRAVENOUS CONTINUOUS PRN
Status: DISCONTINUED | OUTPATIENT
Start: 2022-06-01 | End: 2022-06-01 | Stop reason: SDUPTHER

## 2022-06-01 RX ORDER — DIAPER,BRIEF,INFANT-TODD,DISP
EACH MISCELLANEOUS PRN
Status: DISCONTINUED | OUTPATIENT
Start: 2022-06-01 | End: 2022-06-01 | Stop reason: ALTCHOICE

## 2022-06-01 RX ORDER — SODIUM CHLORIDE 0.9 % (FLUSH) 0.9 %
5-40 SYRINGE (ML) INJECTION EVERY 12 HOURS SCHEDULED
Status: DISCONTINUED | OUTPATIENT
Start: 2022-06-01 | End: 2022-06-02 | Stop reason: HOSPADM

## 2022-06-01 RX ADMIN — FENTANYL CITRATE 50 MCG: 50 INJECTION, SOLUTION INTRAMUSCULAR; INTRAVENOUS at 16:41

## 2022-06-01 RX ADMIN — FENTANYL CITRATE 50 MCG: 50 INJECTION, SOLUTION INTRAMUSCULAR; INTRAVENOUS at 17:00

## 2022-06-01 RX ADMIN — MIDAZOLAM 2 MG: 1 INJECTION INTRAMUSCULAR; INTRAVENOUS at 15:49

## 2022-06-01 RX ADMIN — SODIUM CHLORIDE: 9 INJECTION, SOLUTION INTRAVENOUS at 12:22

## 2022-06-01 RX ADMIN — ROCURONIUM BROMIDE 10 MG: 10 SOLUTION INTRAVENOUS at 16:40

## 2022-06-01 RX ADMIN — HYDROMORPHONE HYDROCHLORIDE 0.5 MG: 1 INJECTION, SOLUTION INTRAMUSCULAR; INTRAVENOUS; SUBCUTANEOUS at 19:28

## 2022-06-01 RX ADMIN — DEXAMETHASONE SODIUM PHOSPHATE 10 MG: 10 INJECTION INTRAMUSCULAR; INTRAVENOUS at 16:09

## 2022-06-01 RX ADMIN — Medication 0.6 MG: at 18:08

## 2022-06-01 RX ADMIN — MEPERIDINE HYDROCHLORIDE 12.5 MG: 25 INJECTION INTRAMUSCULAR; INTRAVENOUS; SUBCUTANEOUS at 18:54

## 2022-06-01 RX ADMIN — HYDROMORPHONE HYDROCHLORIDE 0.5 MG: 1 INJECTION, SOLUTION INTRAMUSCULAR; INTRAVENOUS; SUBCUTANEOUS at 20:07

## 2022-06-01 RX ADMIN — SODIUM CHLORIDE: 9 INJECTION, SOLUTION INTRAVENOUS at 17:20

## 2022-06-01 RX ADMIN — HYDROMORPHONE HYDROCHLORIDE 0.5 MG: 1 INJECTION, SOLUTION INTRAMUSCULAR; INTRAVENOUS; SUBCUTANEOUS at 18:36

## 2022-06-01 RX ADMIN — OXYCODONE AND ACETAMINOPHEN 1 TABLET: 5; 325 TABLET ORAL at 20:54

## 2022-06-01 RX ADMIN — HYDROMORPHONE HYDROCHLORIDE 0.25 MG: 1 INJECTION, SOLUTION INTRAMUSCULAR; INTRAVENOUS; SUBCUTANEOUS at 18:58

## 2022-06-01 RX ADMIN — ONDANSETRON 4 MG: 2 INJECTION INTRAMUSCULAR; INTRAVENOUS at 17:44

## 2022-06-01 RX ADMIN — LIDOCAINE HYDROCHLORIDE 60 MG: 20 INJECTION, SOLUTION INTRAVENOUS at 16:09

## 2022-06-01 RX ADMIN — MEPERIDINE HYDROCHLORIDE 12.5 MG: 25 INJECTION INTRAMUSCULAR; INTRAVENOUS; SUBCUTANEOUS at 18:38

## 2022-06-01 RX ADMIN — HYDROMORPHONE HYDROCHLORIDE 0.25 MG: 1 INJECTION, SOLUTION INTRAMUSCULAR; INTRAVENOUS; SUBCUTANEOUS at 19:07

## 2022-06-01 RX ADMIN — CEFAZOLIN 2000 MG: 2 INJECTION, POWDER, FOR SOLUTION INTRAMUSCULAR; INTRAVENOUS at 16:14

## 2022-06-01 RX ADMIN — ROCURONIUM BROMIDE 40 MG: 10 SOLUTION INTRAVENOUS at 16:09

## 2022-06-01 RX ADMIN — PROPOFOL 150 MG: 10 INJECTION, EMULSION INTRAVENOUS at 16:09

## 2022-06-01 RX ADMIN — LABETALOL HYDROCHLORIDE 5 MG: 5 INJECTION INTRAVENOUS at 17:13

## 2022-06-01 RX ADMIN — Medication 3 MG: at 18:08

## 2022-06-01 RX ADMIN — HYDROMORPHONE HYDROCHLORIDE 0.5 MG: 1 INJECTION, SOLUTION INTRAMUSCULAR; INTRAVENOUS; SUBCUTANEOUS at 18:41

## 2022-06-01 RX ADMIN — FENTANYL CITRATE 50 MCG: 50 INJECTION, SOLUTION INTRAMUSCULAR; INTRAVENOUS at 16:29

## 2022-06-01 RX ADMIN — HYDROMORPHONE HYDROCHLORIDE 0.5 MG: 1 INJECTION, SOLUTION INTRAMUSCULAR; INTRAVENOUS; SUBCUTANEOUS at 19:42

## 2022-06-01 RX ADMIN — KETOROLAC TROMETHAMINE 30 MG: 30 INJECTION, SOLUTION INTRAMUSCULAR at 19:26

## 2022-06-01 RX ADMIN — HYDRALAZINE HYDROCHLORIDE 10 MG: 20 INJECTION INTRAMUSCULAR; INTRAVENOUS at 17:32

## 2022-06-01 RX ADMIN — HYDROMORPHONE HYDROCHLORIDE 0.5 MG: 1 INJECTION, SOLUTION INTRAMUSCULAR; INTRAVENOUS; SUBCUTANEOUS at 20:02

## 2022-06-01 RX ADMIN — FENTANYL CITRATE 50 MCG: 50 INJECTION, SOLUTION INTRAMUSCULAR; INTRAVENOUS at 17:43

## 2022-06-01 RX ADMIN — FENTANYL CITRATE 100 MCG: 50 INJECTION, SOLUTION INTRAMUSCULAR; INTRAVENOUS at 16:09

## 2022-06-01 RX ADMIN — SODIUM CHLORIDE: 9 INJECTION, SOLUTION INTRAVENOUS at 15:49

## 2022-06-01 ASSESSMENT — PAIN DESCRIPTION - DESCRIPTORS
DESCRIPTORS: SHARP;DISCOMFORT
DESCRIPTORS: SHARP;THROBBING
DESCRIPTORS: DISCOMFORT;SHARP
DESCRIPTORS: SHARP
DESCRIPTORS: SHARP;DISCOMFORT
DESCRIPTORS: SHARP

## 2022-06-01 ASSESSMENT — PAIN DESCRIPTION - ORIENTATION
ORIENTATION: RIGHT

## 2022-06-01 ASSESSMENT — PAIN DESCRIPTION - FREQUENCY
FREQUENCY: CONTINUOUS

## 2022-06-01 ASSESSMENT — PAIN SCALES - GENERAL
PAINLEVEL_OUTOF10: 10
PAINLEVEL_OUTOF10: 6
PAINLEVEL_OUTOF10: 7
PAINLEVEL_OUTOF10: 4
PAINLEVEL_OUTOF10: 7
PAINLEVEL_OUTOF10: 7
PAINLEVEL_OUTOF10: 6
PAINLEVEL_OUTOF10: 7
PAINLEVEL_OUTOF10: 7
PAINLEVEL_OUTOF10: 8

## 2022-06-01 ASSESSMENT — PAIN DESCRIPTION - LOCATION
LOCATION: KNEE

## 2022-06-01 ASSESSMENT — PAIN DESCRIPTION - PAIN TYPE
TYPE: SURGICAL PAIN

## 2022-06-01 ASSESSMENT — PAIN - FUNCTIONAL ASSESSMENT: PAIN_FUNCTIONAL_ASSESSMENT: NONE - DENIES PAIN

## 2022-06-01 NOTE — OP NOTE
Operative Note      Patient: Shira Vicente  YOB: 1960  MRN: [id number]    Date of Procedure: 6/1/2022    Pre-Op Diagnosis:   RIGHT TIBIAL PLATEAU FRACTURE  Right thigh hematoma    Post-Op Diagnosis: Same       Procedure(s):  1. Right lateral tibial plateau fracture open reduction with internal fixation, bone allograft application   2. Right lateral thigh hematoma incision irrigation debridement       Surgeon(s):  Erwin Andres DO    Assistant:   Resident: Gwen Carlton DO; Patty Velazco DO    Anesthesia: General    Estimated Blood Loss (mL): less than 749     Complications: None    Specimens:   * No specimens in log *    Implants:  Implant Name Type Inv. Item Serial No.  Lot No. LRB No. Used Action   PLATE BNE [id number] 6 H ST R PROX BILAT TIB S STL NEUT MANSOOR - [id number]  PLATE BNE [id number] 6 H ST R PROX BILAT TIB S STL NEUT MANSOOR [id number] [employer] [facility]  Right 1 Implanted   GRAFT BNE SUB 15ML 1.7-10MM CANC CHIP MORSELIZED FRZ DRY - [id number]  GRAFT BNE SUB 15ML 1.7-10MM CANC CHIP MORSELIZED FRZ DRY [id number] [employer] [facility]  Right 1 Implanted   SCREW BNE L40MM DIA3.5MM LINDA S STL ST NONCANNULATED MANSOOR - [id number]  SCREW BNE L40MM DIA3.5MM LINDA S STL ST NONCANNULATED MANSOOR [id number] [employer]  Right 1 Implanted   SCREW BNE L70MM DIA3.5MM LINDA S STL ST MANSOOR FULL THRD - [id number]  SCREW BNE L70MM DIA3.5MM LINDA S STL ST MANSOOR FULL THRD 212. [address] [facility],[address] Floor [facility]  Right 1 Implanted   SCREW BNE L75MM DIA3.5MM LINDA S STL ST MANSOOR FULL THRD T15 - [id number]  SCREW BNE L75MM DIA3.5MM LINDA S STL ST MANSOOR FULL THRD T15 212.127 [employer]  Right 3 Implanted   SCREW BNE L60MM DIA3.5MM LINDA S STL ST MANSOOR FULL THRD - [id number]  SCREW BNE L60MM DIA3.5MM LINDA S STL ST MANSOOR FULL THRD 212.124 [employer]  Right 1 Implanted   SCREW BNE L36MM DIA3.5MM LINDA S STL ST NONCANNULATED MANSOOR - [id number]  SCREW BNE L36MM DIA3.5MM LINDA S STL ST NONCANNULATED MANSOOR  [employer] [id number] Right 1 Implanted         Drains: * No LDAs found *    Detailed Description of Procedure:   Brought to the operating suite where she was placed on operative table supine position. She received a general anesthetic by the department esthesia as well as 2 g of Ancef intravenously. Right lower extremity sterilely prepped and draped out standard sterile fashion. Surgical timeout is performed per protocol by members of surgical team.  Sterile tourniquet was applied. Leg was elevated exsanguinated with an Esmarch tourniquet set at 250 mmHg pressure. Curvilinear incision made with proximal lateral tibia skin was incised with scalpel electrocautery taken to subtends tissues. Full-thickness flaps were created. The iliotibial band was divided in line with its fibers this was curved over Franchesca's tubercle and just lateral to the tibial crest.  The tibialis anterior muscle belly was then elevated with electrocautery and blunt elevation. The iliotibial band fascial flaps were reflected anteriorly posteriorly. A 7 disc arthrotomy was now made with the scalpel. Large hemarthrosis was suctioned and irrigated. The meniscus was intact on its periphery with #2 FiberWire anteriorly and posteriorly. This was inspected. This was without injury. A mini fragment distractor was then placed with a pin in the distal lateral femur intra incisional and 1 in the proximal tibia. We now distracted the lateral joint surface. The fracture is now inspected. Patient had an impacted fracture of the lateral plateau with 2 separate lateral wall fracture lines. We worked through the more lateral fracture line as we reflected the anterior piece anteriorly this give us better visualization of the articular impaction. Used a [place] to delineate the main fracture fragments creating subchondral islands of bone. Do not disimpact with bone tamps and osteotomes.   Provisional Shea wires utilized to maintain the reductions. The cancellous void was now backfilled with cortical cancellous allograft chips using a bone tamp. The 2 lateral fragments are now keyed back into position to the articular segments and pinned into position. Fluoroscopy then utilized confirming appropriate articular reduction in multiple views. Plate was then positioned over the proximal lateral tibia was pinned into position. Fluoroscopy confirmed appropriate positioning. Plate was fixated with bicortical screw to the intact shaft distally. Particular clamp was then placed a small incision was made about the proximal medial tibia. Leg screws now placed across the articular surface followed by multiple locking screws. Initial compression screw was exchanged for locking screw. A kickstand locking screw was placed followed by a final bicortical screw using perfect [place] fluoroscopy through small stab incision. This point time felt we had appropriate duction stabilization final images were taken and saved to [facility] system. Wounds were thoroughly irrigated normal saline solution. Standard layered closure was utilized. Tourniquet was let down at this juncture. We now turned our attention to the hematoma over the proximal lateral thigh. A longitudinal incision was made over this most fluctuant region. Skin was incised with a scalpel electrocautery taken to subcutaneous tissues. Deep to the adipose tissue but superficial to the fascia there is a large hematoma and seroma. This was noninfectious appearing. This was debrided bluntly and suctioned and irrigated. This was closed in standard layered fashion. Compressive dressing was applied as well as standard sterile dressings to the leg. Patient was placed in the knee ROM brace. She was now awoken uneventfully from anesthetic transfer onto the [facility] to the postanesthesia care in stable condition.     Post operative plans:  Patient will be discharged home today as this was scheduled for an outpatient procedure. She received a prescription for pain control. She will be started on aspirin for DVT prophylaxis postoperative day 1. She is to maintain her knee brace and be strict nonweightbearing affected extremity. Brace can be unlocked 0 to 30 degrees. She will see us back in orthopedic office in 2 weeks for repeat evaluation. Electronically signed by Darnell Viera DO on 6/1/2022     NOTE: This report was transcribed using voice recognition software.  Every effort was made to ensure accuracy; however, inadvertent computerized transcription errors may be present

## 2022-06-01 NOTE — INTERVAL H&P NOTE
Update History & Physical    The patient's History and Physical of May 31, 2022 was reviewed with the patient and I examined the patient. There was no change. The surgical site was confirmed by the patient and me. Plan:   Right tibial plateau fracture open reduction with internal fixation, possible bone allograft  Right thigh hematoma incision irrigation debridement     I have explained the risks and complications of the recommended surgery with the patient at length, as well as discussed potential treatment alternatives including nonoperative management. These risks include but are not limited to death or complication from anesthesia, continued pain, nerve tendon or vascular injury, infection, nonunion or malunion, symptomatic hardware or hardware failure, deep vein thrombosis or pulmonary embolism, hematoma recurrence, stiffness, unforeseen complications, and need for further surgery, etc.  Patient understood this, asked appropriate questions, which were all answered, and she has elected to proceed with the procedure.     Electronically signed by Chucky Nunes DO on 6/1/2022 at 1:33 PM
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
## 2022-06-01 NOTE — TELEPHONE ENCOUNTER
Contacted patient regarding [facility] surgery time for DOS 6-1-2022 with Dr. Jess Mclean. No answer. Voicemail left stating surgery time is 2 pm for 6-1-2022 with hospital arrival time of 12 pm. Please contact our office or [facility] PAT Department if there are any further questions.

## 2022-06-02 ENCOUNTER — TELEPHONE (OUTPATIENT)
Dept: ADMINISTRATIVE | Age: 62
End: 2022-06-02

## 2022-06-02 NOTE — PROGRESS NOTES
Patient tolerating oral intake     Person waiting for patient at bedside    Discharge instructions provided to patient, written and verbal, with significant other at bedside, patient verbalized understanding. Printed scripts given in packet along with printed discharge instructions.

## 2022-06-02 NOTE — PROGRESS NOTES
Iv site removed. Assisted patient in getting dressed. Assisted patient to bathroom     Transport requested via wheelchair.

## 2022-06-02 NOTE — ANESTHESIA POSTPROCEDURE EVALUATION
Department of Anesthesiology  Postprocedure Note    Patient: Viridiana Gurrola  MRN: [id number]  YOB: 1960  Date of evaluation: 6/2/2022  Time:  5:49 AM     Procedure Summary     Date: 06/01/22 Room / Location: [facility] OR 08 / [facility]    Anesthesia Start: 1045 Anesthesia Stop: 1822    Procedure: RIGHT TIBIAL PLATEAU FRACTURE  OPEN REDUCTION INTERNAL FIXATION, WITH BONE ALLOGRAFT, SOFT TISSUE REPAIRS AS INDICATED, RIGHT THIGH HEMATOMA IRRIGATION AND DEBRIDEMENT (Right Leg Lower) Diagnosis:       Closed displaced fracture of lateral condyle of right tibia with routine healing, subsequent encounter      (RIGHT TIBIAL PLATEAU FRACTURE)    Surgeons: Portia Meza DO Responsible Provider: Geovanna Jama DO    Anesthesia Type: general ASA Status: 3          Anesthesia Type: general    Antelmo Phase I: Antelmo Score: 10    Antelmo Phase II: Antelmo Score: 10    Last vitals: Reviewed and per EMR flowsheets.        Anesthesia Post Evaluation    Patient location during evaluation: PACU  Patient participation: complete - patient participated  Level of consciousness: awake and alert  Pain score: 1  Airway patency: patent  Nausea & Vomiting: no nausea and no vomiting  Complications: no  Cardiovascular status: hemodynamically stable  Respiratory status: acceptable  Hydration status: euvolemic

## 2022-06-03 NOTE — TELEPHONE ENCOUNTER
Call to Fernando Hartman pt's , scheduled appt   Future Appointments   Date Time Provider Brad Yen   6/13/2022  8:15 AM SCHEDULE, [facility]

## 2022-06-07 DIAGNOSIS — S82.143A TIBIAL PLATEAU FRACTURE, UNSPECIFIED LATERALITY, CLOSED, INITIAL ENCOUNTER: Primary | ICD-10-CM

## 2022-06-13 ENCOUNTER — OFFICE VISIT (OUTPATIENT)
Dept: ORTHOPEDIC SURGERY | Age: 62
End: 2022-06-13
Payer: COMMERCIAL

## 2022-06-13 ENCOUNTER — HOSPITAL ENCOUNTER (OUTPATIENT)
Dept: GENERAL RADIOLOGY | Age: 62
Discharge: HOME | End: 2022-06-15
Payer: COMMERCIAL

## 2022-06-13 VITALS — WEIGHT: 150 LBS | HEIGHT: 65 IN | BODY MASS INDEX: 24.99 KG/M2

## 2022-06-13 DIAGNOSIS — S82.121A CLOSED FRACTURE OF LATERAL PORTION OF RIGHT TIBIAL PLATEAU, INITIAL ENCOUNTER: Primary | ICD-10-CM

## 2022-06-13 DIAGNOSIS — S82.143A TIBIAL PLATEAU FRACTURE, UNSPECIFIED LATERALITY, CLOSED, INITIAL ENCOUNTER: ICD-10-CM

## 2022-06-13 PROCEDURE — 99212 OFFICE O/P EST SF 10 MIN: CPT

## 2022-06-13 PROCEDURE — 99024 POSTOP FOLLOW-UP VISIT: CPT | Performed by: PHYSICIAN ASSISTANT

## 2022-06-13 PROCEDURE — 73590 X-RAY EXAM OF LOWER LEG: CPT

## 2022-06-13 NOTE — PROGRESS NOTES
Patient here for a 2 week postop check right tib plateau fx, DOS 15/13/9793. Patient states the there is still swelling around the knee, minimal pain. Patient denies any tingling and numbness in the toes, just minimal numbness around the suture site.           Electronically signed by Aquiles Yarbrough MA on 6/13/2022 at 8:11 AM

## 2022-06-13 NOTE — PATIENT INSTRUCTIONS
Continue nonweightbearing right lower extremity. Continue hinged ROM knee brace. Brace was unlocked 0-40 today. Continue to increase brace 10 degrees of flexion per week with home therapy. Okay to remove the brace for hygiene and therapy. Recommend Ace wrap under brace for skin protection. Continue baby aspirin twice daily for DVT prophylaxis. If Steri-Strips do not fall off right lower extremity incisions on their own in 7 days, okay to remove. Patient will be set up with [facility] home therapy. Follow-up in 4 weeks for reevaluation and x-rays. Call if any questions or concerns.

## 2022-06-13 NOTE — PROGRESS NOTES
Chief Complaint   Patient presents with    Post-Op Check     right tib plateau. OP:SURGEON: Dr. Yohana Chirinos, DO  DATE OF PROCEDURE: 6-1-22  PROCEDURE: 1. Right lateral tibial plateau fracture open reduction with internal fixation, bone allograft application   2. Right lateral thigh hematoma incision irrigation debridement      POD: 2 weeks    Subjective:  Agapito Lemus is following up from the above surgery. She is NWB on right lower extremity. She ambulates with assistive device, crutches. Pain to extremity is none and is not taking prescribed pain medication. They denies numbness or tingling to the right lower extremity. Denies calf pain, chest pain, or shortness of breath. Patient continues to use DVT prophylaxis, ASA 81 mg BID. Patient is not participating in therapy at this time. She is doing well after surgery. Denies any incisional issues. Denies pain or problems at this time. Review of Systems -  All pertinent positives/negative in HPI     Objective:    General: Alert and oriented X 3, normocephalic atraumatic, external ears and eye normal, sclera clear, no acute distress, respirations easy and unlabored with no audible wheezes, skin warm and dry, speech and dress appropriate for noted age, affect euthymic. Extremity:  Right Lower Extremity  Skin clean dry and intact, without signs of infection   Incision well-healed. Sutures were removed and Steri-Strips applied. mild edema noted  Compartments supple throughout thigh and leg  Calf supple and not tender  negative Homans  Demonstrates limited active motion with knee flexion/extension  Ankle dorsi/plantar flexion intact  States sensation intact to touch in sural, deep peroneal, superficial peroneal, saphenous, posterior tibial  nerve distributions to foot/ankle. Palpable dorsalis pedis and posterior tibialis pulses, cap refill brisk in toes, foot warm/perfused.     Ht 5' 5\" (1.651 m)   Wt 150 lb (68 kg)   BMI 24.96 kg/m²     XR:   2 views right tib-fib ORIF right lateral tibial plateau fracture with the hardware in stable position and alignment. No evidence of hardware loosening or failure. Assessment:   Diagnosis Orders   1. Closed fracture of lateral portion of right tibial plateau, subsequent encounter       Plan:  X-rays reviewed and discussed. Continue nonweightbearing right lower extremity. Continue hinged ROM knee brace. Brace was unlocked 0-40 today. Continue to increase brace 10 degrees of flexion per week with home therapy. Okay to remove the brace for hygiene and therapy. Recommend Ace wrap under brace for skin protection. Continue baby aspirin twice daily for DVT prophylaxis. If Steri-Strips do not fall off right lower extremity incisions on their own in 7 days, okay to remove. Patient will be set up with [facility] home therapy. Follow-up in 4 weeks for reevaluation and x-rays. Call if any questions or concerns. Electronically signed by KHADIJAH Sadler on 6/13/2022 at 8:36 AM  Note: This report was completed using computerSmartCrowdz voiced recognition software. Every effort has been made to ensure accuracy; however, inadvertent computerized transcription errors may be present.

## 2022-06-20 ENCOUNTER — TELEPHONE (OUTPATIENT)
Dept: ORTHOPEDIC SURGERY | Age: 62
End: 2022-06-20

## 2022-06-20 DIAGNOSIS — S82.121A CLOSED FRACTURE OF LATERAL PORTION OF RIGHT TIBIAL PLATEAU, INITIAL ENCOUNTER: Primary | ICD-10-CM

## 2022-06-20 NOTE — TELEPHONE ENCOUNTER
Received call from [facility] stating that they are not in network and want to verify if they can send the patient to [facility] instead.          Future Appointments   Date Time Provider Brad Yen   7/11/2022  8:00 AM SCHEDULE, [facility]

## 2022-07-11 ENCOUNTER — HOSPITAL ENCOUNTER (OUTPATIENT)
Dept: GENERAL RADIOLOGY | Age: 62
Discharge: HOME | End: 2022-07-13
Payer: COMMERCIAL

## 2022-07-11 ENCOUNTER — OFFICE VISIT (OUTPATIENT)
Dept: ORTHOPEDIC SURGERY | Age: 62
End: 2022-07-11
Payer: COMMERCIAL

## 2022-07-11 VITALS — BODY MASS INDEX: 24.99 KG/M2 | WEIGHT: 150 LBS | HEIGHT: 65 IN

## 2022-07-11 DIAGNOSIS — S82.121A CLOSED FRACTURE OF LATERAL PORTION OF RIGHT TIBIAL PLATEAU, INITIAL ENCOUNTER: Primary | ICD-10-CM

## 2022-07-11 DIAGNOSIS — S82.121A CLOSED FRACTURE OF LATERAL PORTION OF RIGHT TIBIAL PLATEAU, INITIAL ENCOUNTER: ICD-10-CM

## 2022-07-11 PROCEDURE — 99212 OFFICE O/P EST SF 10 MIN: CPT

## 2022-07-11 PROCEDURE — 99024 POSTOP FOLLOW-UP VISIT: CPT | Performed by: PHYSICIAN ASSISTANT

## 2022-07-11 PROCEDURE — 73560 X-RAY EXAM OF KNEE 1 OR 2: CPT

## 2022-07-11 NOTE — PROGRESS NOTES
Patient here for a 6 week postop check right tibial plateau fx. DOS 06/01/2022. Patient started physical therapy at home with [facility] last week. Patient states that since they started therapy with her she has been able to slightly bend knee with hinged rom brace on. Patient feels some pain at times. Patient is taking acetaminophen only when needed for pain control.       Electronically signed by Pastor Ng MA on 7/11/2022 at 8:02 AM

## 2022-07-11 NOTE — PROGRESS NOTES
Chief Complaint   Patient presents with    Post-Op Check     right tibial plateau       OP:SURGEON: Dr. Jeana Anand DO  DATE OF PROCEDURE: 6-1-22  PROCEDURE: 1. Right lateral tibial plateau fracture open reduction with internal fixation, bone allograft application   2. Right lateral thigh hematoma incision irrigation debridement     Subjective:  Paige Flowers is approximately 6 wks follow-up from the above surgery. She has been nonweightbearing to the right lower extremity. She is using crutches for ambulation and has still maintained her hinged knee brace, now set 0-70 degrees. She is receiving home health physical therapy. She has finished DVT prophylaxis. Only takes Tylenol occasionally for pain. States that most of her pain today is to the anterior aspect of the proximal leg with mild edema, but is tolerable. Denies calf pain, CP, SOB, fever, chills, malaise. Review of Systems -  all pertinent positives and negatives in HPI. Objective:    General: Alert and oriented X 3, normocephalic atraumatic, external ears and eye normal, sclera clear, no acute distress, respirations easy and unlabored with no audible wheezes, skin warm and dry, speech and dress appropriate for noted age, affect euthymic. Extremity:  Right Lower Extremity  Skin clean dry and intact, without signs of infection  Incision healed  Mild edema anterior proximal leg with mild TTP, nontender at the knee joint   AROM 5-90, passively can flex to 100  Compartments supple throughout thigh and leg  Calf supple and nontender  Demonstrates active ankle plantar/dorsiflexion/great toe extension. States sensation intact to touch in sural/deep peroneal/superficial peroneal/saphenous/posterior tibial nerve distributions to foot/ankle. Palpable dorsalis pedis and posterior tibialis pulses, cap refill brisk in toes, foot warm/perfused.              Ht 5' 5\" (1.651 m)   Wt 150 lb (68 kg)   BMI 24.96 kg/m²     XR:   Narrative EXAMINATION:   TWO XRAY VIEWS OF THE RIGHT KNEE       7/11/2022 7:16 am       COMPARISON:   1 June 2022       HISTORY:   ORDERING SYSTEM PROVIDED HISTORY: Closed fracture of lateral portion of right   tibial plateau, initial encounter   TECHNOLOGIST PROVIDED HISTORY:   What reading provider will be dictating this exam?->CRC       FINDINGS:   Small right knee joint effusion.  Intact tibial fixation hardware with stable   alignment of fracture fragments.  Radiodensity at the lateral weight-bearing   compartment likely reflects developing callus formation rather than   chondrocalcinosis. .           Impression   Intact tibial fixation hardware with likely developing callus formation as   outlined above.                   Assessment:   Diagnosis Orders   1. Closed fracture of lateral portion of right tibial plateau, initial encounter  XR KNEE RIGHT (1-2 VIEWS)    XR KNEE RIGHT (1-2 VIEWS)       Plan:   Reviewed x-rays with patient today in office    Nonweightbearing right lower extremity   Continue physical therapy and HEP   Continue wearing hinged ROM brace active during the day, can remove when sedentary and sleeping. Set 0-100 degrees today. Continue advancing 10 degrees/week. Okay for straight leg raise actively.  Over-the-counter analgesics as needed    Follow up in 4-6 wkswith XR of the R knee    Electronically signed by Rojas Eller PA-C on 7/11/2022 at 8:44 AM  Note: This report was completed using [employer] voiced recognition software. Every effort has been made to ensure accuracy; however, inadvertent computerized transcription errors may be present.

## 2022-07-13 ENCOUNTER — TELEPHONE (OUTPATIENT)
Dept: ORTHOPEDIC SURGERY | Age: 62
End: 2022-07-13

## 2022-08-11 ENCOUNTER — OFFICE VISIT (OUTPATIENT)
Dept: ORTHOPEDIC SURGERY | Age: 62
End: 2022-08-11
Payer: COMMERCIAL

## 2022-08-11 ENCOUNTER — HOSPITAL ENCOUNTER (OUTPATIENT)
Dept: GENERAL RADIOLOGY | Age: 62
Discharge: HOME | End: 2022-08-13
Payer: COMMERCIAL

## 2022-08-11 DIAGNOSIS — S82.121D CLOSED FRACTURE OF LATERAL PORTION OF RIGHT TIBIAL PLATEAU WITH ROUTINE HEALING, SUBSEQUENT ENCOUNTER: Primary | ICD-10-CM

## 2022-08-11 DIAGNOSIS — S82.121A CLOSED FRACTURE OF LATERAL PORTION OF RIGHT TIBIAL PLATEAU, INITIAL ENCOUNTER: ICD-10-CM

## 2022-08-11 PROCEDURE — 99212 OFFICE O/P EST SF 10 MIN: CPT

## 2022-08-11 PROCEDURE — 73560 X-RAY EXAM OF KNEE 1 OR 2: CPT

## 2022-08-11 PROCEDURE — 99024 POSTOP FOLLOW-UP VISIT: CPT | Performed by: PHYSICIAN ASSISTANT

## 2022-08-11 NOTE — PROGRESS NOTES
Chief Complaint   Patient presents with    Post-Op Check     10 week post op right tibial plateau fracture. Pt is wearing leg brace and using crutches. Pt states doing good and no complaints. OP:SURGEON: Dr. Niki Krishna DO  DATE OF PROCEDURE: 6-1-22  PROCEDURE: 1. Right lateral tibial plateau fracture open reduction with internal fixation, bone allograft application   2. Right lateral thigh hematoma incision irrigation debridement     Subjective:  Catherine Beckman is approximately 10 weeks follow-up from the above surgery. She has been NWB R LE with crutches still wearing hinged knee brace completely unlocked. She has stopped taking ASA for DVT prophylaxis. [facility] PT has paused due to NWB status. Denies much pain at the knee or proximal tibia, just some mild edema to the lateral knee. States her edema and ecchymosis to the thigh s/p hematoma I&D has resolved. Denies calf pain, CP, SOB, fever, chills, malaise. Review of Systems -  all pertinent positives and negatives in HPI. Objective:    General: Alert and oriented X 3, normocephalic atraumatic, external ears and eye normal, sclera clear, no acute distress, respirations easy and unlabored with no audible wheezes, skin warm and dry, speech and dress appropriate for noted age, affect euthymic. Extremity:  Right Lower Extremity  Skin clean dry and intact, without signs of infection  Incision healed   Mild edema noted lateral knee   Nontender at the knee and proximal tibia   Compartments supple throughout thigh and leg  Calf supple and nontender  Demonstrates active knee flexion/extension (0-110), ankle plantar/dorsiflexion/great toe extension. States sensation intact to touch in sural/deep peroneal/superficial peroneal/saphenous/posterior tibial nerve distributions to foot/ankle. Palpable dorsalis pedis and posterior tibialis pulses, cap refill brisk in toes, foot warm/perfused.              XR:   2 views of R knee demonstrating interval healing of tibial plateau fracture s/p ORIF. Hardware remains intact without interval displacement, loosening, or failure. No significant change in alignment. No acute fractures or dislocations or any other osseus abnormality identified. Assessment:   Diagnosis Orders   1. Closed fracture of lateral portion of right tibial plateau with routine healing, subsequent encounter  XR KNEE RIGHT (1-2 VIEWS)          Plan:  Reviewed x-rays with patient today in office   WB:   progressive WB R LE   advancing 25% every few days only as tolerable- use assistive devices if needed. Once WBAT, then start to wean from knee brace and AD. Brace completely unlocked, only needs this on while WB  Therapy: finish remaining [facility], then call with outpatient location so we can fax referral   Multimodal pain control, RICE PRN      Follow up in 6-8 wks with R knee XR    Electronically signed by Bozena Gunn PA-C on 8/11/2022 at 8:26 AM  Note: This report was completed using [email or web address] voiced recognition software. Every effort has been made to ensure accuracy; however, inadvertent computerized transcription errors may be present.

## 2022-08-15 ENCOUNTER — TELEPHONE (OUTPATIENT)
Dept: ORTHOPEDIC SURGERY | Age: 62
End: 2022-08-15

## 2022-08-15 DIAGNOSIS — S82.121D CLOSED FRACTURE OF LATERAL PORTION OF RIGHT TIBIAL PLATEAU WITH ROUTINE HEALING, SUBSEQUENT ENCOUNTER: Primary | ICD-10-CM

## 2022-08-15 NOTE — TELEPHONE ENCOUNTER
Orders signed. Please see attached. Thank you.   Electronically signed by Fransisco Rodrigues PA-C on 8/15/2022 at 8:56 AM

## 2022-08-15 NOTE — TELEPHONE ENCOUNTER
Patient requesting a new prescription for physical therapy be sent to [facility] in [place], [place]    Procedure(s): 6/1/22    1. Right lateral tibial plateau fracture open reduction with internal fixation, bone allograft application   2. Right lateral thigh hematoma incision irrigation debridement       Future Appointments   Date Time Provider Brad Yen   9/22/2022  8:00 AM Gloria Urrutia DO [facility]     Pended and routed to providers.

## 2022-09-06 ENCOUNTER — TELEPHONE (OUTPATIENT)
Dept: ADMINISTRATIVE | Age: 62
End: 2022-09-06

## 2022-09-06 NOTE — TELEPHONE ENCOUNTER
Pt has an upcoming post op visit with Dr Anayeli Kennedy on 9/22 and said that she has developed pain and swelling and would like to be seen sooner to make sure all is still intact.

## 2022-09-06 NOTE — TELEPHONE ENCOUNTER
Call to pt advised There is already a R knee x-ray ordered in [facility]. She can go to any [facility] radiology center to get this done and then we can call with results. Have pt call us when this is completed so we can review the images. Keep appt on 9/22. If she had a new injury/fall/trauma, she should go to the ED or urgent care for sooner evaluation. Intermittent pain and swelling is still normal 3 months out from surgery. Pt verbalized understanding.

## 2022-09-06 NOTE — TELEPHONE ENCOUNTER
There is already a R knee x-ray ordered in [facility]. She can go to any ambulatory [facility] radiology center to get this done and then we can call with results. Have pt call us when this is completed so we can review the images. Keep appt on 9/22. If she had a new injury/fall/trauma, she should go to the ED or urgent care for sooner evaluation. Intermittent pain and swelling is still normal 3 months out from surgery.

## 2022-09-07 NOTE — TELEPHONE ENCOUNTER
Call placed to pt regarding XR results. All questions answered.  Keep next appt    Future Appointments   Date Time Provider Brad Yen   9/22/2022  8:00  [facility]

## 2022-09-16 DIAGNOSIS — S82.121D CLOSED FRACTURE OF LATERAL PORTION OF RIGHT TIBIAL PLATEAU WITH ROUTINE HEALING, SUBSEQUENT ENCOUNTER: Primary | ICD-10-CM

## 2022-09-22 ENCOUNTER — OFFICE VISIT (OUTPATIENT)
Dept: ORTHOPEDIC SURGERY | Age: 62
End: 2022-09-22
Payer: COMMERCIAL

## 2022-09-22 ENCOUNTER — HOSPITAL ENCOUNTER (OUTPATIENT)
Dept: GENERAL RADIOLOGY | Age: 62
Discharge: HOME | End: 2022-09-24
Payer: COMMERCIAL

## 2022-09-22 VITALS — HEIGHT: 65 IN | TEMPERATURE: 98 F | BODY MASS INDEX: 24.99 KG/M2 | WEIGHT: 150 LBS

## 2022-09-22 DIAGNOSIS — S82.121D CLOSED FRACTURE OF LATERAL PORTION OF RIGHT TIBIAL PLATEAU WITH ROUTINE HEALING, SUBSEQUENT ENCOUNTER: ICD-10-CM

## 2022-09-22 DIAGNOSIS — S82.121D CLOSED FRACTURE OF LATERAL PORTION OF RIGHT TIBIAL PLATEAU WITH ROUTINE HEALING, SUBSEQUENT ENCOUNTER: Primary | ICD-10-CM

## 2022-09-22 PROCEDURE — 99213 OFFICE O/P EST LOW 20 MIN: CPT | Performed by: PHYSICIAN ASSISTANT

## 2022-09-22 PROCEDURE — 73560 X-RAY EXAM OF KNEE 1 OR 2: CPT

## 2022-09-22 PROCEDURE — 99212 OFFICE O/P EST SF 10 MIN: CPT

## 2022-09-22 NOTE — PROGRESS NOTES
Chief Complaint   Patient presents with    Knee Pain     Right tibial plateau fx follow up. 16 week check. Knee is doing well. Patient in brace today but admits to not wearing the brace that much anymore due to brace falling apart. In physical therapy two times a week. Patient has been walking without a crutch for about a week now. Painful in anterior and lateral knee when walking. Does have some pain in the ankle as well. Not taking any medication for pain at this time. Does have some numbness where scar is. OP:SURGEON: Dr. Dany Sloan DO  DATE OF PROCEDURE: 6-1-22  PROCEDURE: 1. Right lateral tibial plateau fracture open reduction with internal fixation, bone allograft application   2. Right lateral thigh hematoma incision irrigation debridement      POD: 16 weeks    Subjective:  Zena Belle is following up from the above surgery. She is WBAT on right lower extremity. She ambulates with no assistive device. Pain to extremity is none and is not taking prescribed pain medication. They denies numbness or tingling to the right lower extremity. Denies calf pain, chest pain, or shortness of breath. Patient has finished DVT prophylaxis. Patient is participating in therapy, outpatient therapy. She is doing well after surgery. Denies any pain or problems in the right knee. Review of Systems -  All pertinent positives/negative in HPI     Objective:    General: Alert and oriented X 3, normocephalic atraumatic, external ears and eye normal, sclera clear, no acute distress, respirations easy and unlabored with no audible wheezes, skin warm and dry, speech and dress appropriate for noted age, affect euthymic.     Extremity:  Right Lower Extremity  Skin clean dry and intact, without signs of infection   Incision well-healed  mild edema noted to the knee  Compartments supple throughout thigh and leg  Calf supple and not tender  negative Homans  Demonstrates active knee ROM 0-130 without pain  Ambulates with no assistive devices  States sensation intact to touch in sural, deep peroneal, superficial peroneal, saphenous, posterior tibial  nerve distributions to foot/ankle. Palpable dorsalis pedis and posterior tibialis pulses, cap refill brisk in toes, foot warm/perfused. Temp 98 °F (36.7 °C)   Ht 5' 5\" (1.651 m)   Wt 150 lb (68 kg)   BMI 24.96 kg/m²     XR:   2 views right knee demonstrate ORIF right lateral tibial plateau fracture with hardware in stable position and alignment. No evidence of hardware loosening or failure. Assessment:   Diagnosis Orders   1. Closed fracture of lateral portion of right tibial plateau with routine healing, subsequent encounter          Plan:  X-rays reviewed and discussed. Weightbearing as tolerated right lower extremity. Okay to stop wearing hinged knee brace    Continue PT. Follow-up as needed. Call if any questions or concerns. Electronically signed by KHADIJAH Love on 9/22/2022 at 8:24 AM  Note: This report was completed using [employer] voiced recognition software. Every effort has been made to ensure accuracy; however, inadvertent computerized transcription errors may be present.

## 2022-09-22 NOTE — PATIENT INSTRUCTIONS
Weightbearing as tolerated right lower extremity. Okay to stop wearing hinged knee brace    Continue PT. Follow-up as needed. Call if any questions or concerns.

## 2023-10-20 LAB
CORTIS SERPL-MCNC: 9.5 UG/DL (ref 2.7–18.4)
CORTISOL COLLECTION INFO: NORMAL
CRP SERPL HS-MCNC: <3 MG/L (ref 0–5)
ERYTHROCYTE [SEDIMENTATION RATE] IN BLOOD BY WESTERGREN METHOD: 6 MM/HR (ref 0–20)
ESTRADIOL LEVEL: <5 PG/ML
FSH SERPL-ACNC: 53.6 MIU/ML
LH SERPL-ACNC: 30.3 MIU/ML
PHOSPHATE SERPL-MCNC: 3.4 MG/DL (ref 2.5–4.5)
T3FREE SERPL-MCNC: 3.19 PG/ML (ref 2–4.4)
T4 SERPL-MCNC: 7.7 UG/DL (ref 4.5–11.7)
TESTOST SERPL-MCNC: 21 NG/DL (ref 3–41)
TSH SERPL DL<=0.05 MIU/L-ACNC: 1.52 UIU/ML (ref 0.27–4.2)
URATE SERPL-MCNC: 4.1 MG/DL (ref 2.4–5.7)

## 2023-10-21 LAB — RHEUMATOID FACT SER NEPH-ACNC: 12 IU/ML (ref 0–13)

## 2023-10-24 LAB — DHEA: 1.74 NG/ML (ref 0.63–4.7)

## 2023-10-25 LAB
ESTRADIOL LEVEL: 9.1 PG/ML
ESTROGEN TOTAL: 37.1 PG/ML
ESTRONE SERPL-MCNC: 28 PG/ML

## 2023-10-26 LAB
INSULIN COMMENT: NORMAL
INSULIN REFERENCE RANGE:: NORMAL
INSULIN: 5.6 MU/L

## 2024-01-29 ENCOUNTER — HOSPITAL ENCOUNTER (OUTPATIENT)
Age: 64
Discharge: HOME | End: 2024-01-31
Payer: COMMERCIAL

## 2024-01-29 ENCOUNTER — OFFICE VISIT (OUTPATIENT)
Dept: PRIMARY CARE CLINIC | Age: 64
End: 2024-01-29
Payer: COMMERCIAL

## 2024-01-29 ENCOUNTER — HOSPITAL ENCOUNTER (OUTPATIENT)
Dept: GENERAL RADIOLOGY | Age: 64
Discharge: HOME | End: 2024-01-31
Payer: COMMERCIAL

## 2024-01-29 VITALS
RESPIRATION RATE: 16 BRPM | SYSTOLIC BLOOD PRESSURE: 136 MMHG | HEART RATE: 86 BPM | TEMPERATURE: 98.2 F | BODY MASS INDEX: 26.42 KG/M2 | DIASTOLIC BLOOD PRESSURE: 81 MMHG | WEIGHT: 158.6 LBS | OXYGEN SATURATION: 96 % | HEIGHT: 65 IN

## 2024-01-29 DIAGNOSIS — J40 BRONCHITIS: ICD-10-CM

## 2024-01-29 DIAGNOSIS — Z13.1 SCREENING FOR DIABETES MELLITUS (DM): ICD-10-CM

## 2024-01-29 DIAGNOSIS — J40 BRONCHITIS: Primary | ICD-10-CM

## 2024-01-29 DIAGNOSIS — E78.2 MIXED HYPERLIPIDEMIA: ICD-10-CM

## 2024-01-29 DIAGNOSIS — R05.1 ACUTE COUGH: ICD-10-CM

## 2024-01-29 DIAGNOSIS — Z76.89 ESTABLISHING CARE WITH NEW DOCTOR, ENCOUNTER FOR: ICD-10-CM

## 2024-01-29 PROBLEM — S82.121A CLOSED FRACTURE OF LATERAL PORTION OF RIGHT TIBIAL PLATEAU: Status: RESOLVED | Noted: 2022-05-31 | Resolved: 2024-01-29

## 2024-01-29 PROCEDURE — 71046 X-RAY EXAM CHEST 2 VIEWS: CPT

## 2024-01-29 PROCEDURE — 99204 OFFICE O/P NEW MOD 45 MIN: CPT | Performed by: FAMILY MEDICINE

## 2024-01-29 PROCEDURE — 1123F ACP DISCUSS/DSCN MKR DOCD: CPT | Performed by: FAMILY MEDICINE

## 2024-01-29 RX ORDER — QUETIAPINE FUMARATE 50 MG/1
50 TABLET, FILM COATED ORAL 2 TIMES DAILY
COMMUNITY

## 2024-01-29 RX ORDER — DOXYCYCLINE HYCLATE 100 MG
100 TABLET ORAL 2 TIMES DAILY
Qty: 10 TABLET | Refills: 0 | Status: SHIPPED | OUTPATIENT
Start: 2024-01-29 | End: 2024-02-03

## 2024-01-29 RX ORDER — BENZONATATE 100 MG/1
100 CAPSULE ORAL 3 TIMES DAILY PRN
Qty: 30 CAPSULE | Refills: 1 | Status: SHIPPED | OUTPATIENT
Start: 2024-01-29 | End: 2024-02-18

## 2024-01-29 SDOH — ECONOMIC STABILITY: HOUSING INSECURITY
IN THE LAST 12 MONTHS, WAS THERE A TIME WHEN YOU DID NOT HAVE A STEADY PLACE TO SLEEP OR SLEPT IN A SHELTER (INCLUDING NOW)?: NO

## 2024-01-29 SDOH — ECONOMIC STABILITY: FOOD INSECURITY: WITHIN THE PAST 12 MONTHS, YOU WORRIED THAT YOUR FOOD WOULD RUN OUT BEFORE YOU GOT MONEY TO BUY MORE.: NEVER TRUE

## 2024-01-29 SDOH — ECONOMIC STABILITY: INCOME INSECURITY: HOW HARD IS IT FOR YOU TO PAY FOR THE VERY BASICS LIKE FOOD, HOUSING, MEDICAL CARE, AND HEATING?: NOT HARD AT ALL

## 2024-01-29 SDOH — ECONOMIC STABILITY: FOOD INSECURITY: WITHIN THE PAST 12 MONTHS, THE FOOD YOU BOUGHT JUST DIDN'T LAST AND YOU DIDN'T HAVE MONEY TO GET MORE.: NEVER TRUE

## 2024-01-29 ASSESSMENT — PATIENT HEALTH QUESTIONNAIRE - PHQ9
SUM OF ALL RESPONSES TO PHQ QUESTIONS 1-9: 5
SUM OF ALL RESPONSES TO PHQ QUESTIONS 1-9: 5
5. POOR APPETITE OR OVEREATING: 0
1. LITTLE INTEREST OR PLEASURE IN DOING THINGS: 2
10. IF YOU CHECKED OFF ANY PROBLEMS, HOW DIFFICULT HAVE THESE PROBLEMS MADE IT FOR YOU TO DO YOUR WORK, TAKE CARE OF THINGS AT HOME, OR GET ALONG WITH OTHER PEOPLE: 0
2. FEELING DOWN, DEPRESSED OR HOPELESS: 1
7. TROUBLE CONCENTRATING ON THINGS, SUCH AS READING THE NEWSPAPER OR WATCHING TELEVISION: 0
6. FEELING BAD ABOUT YOURSELF - OR THAT YOU ARE A FAILURE OR HAVE LET YOURSELF OR YOUR FAMILY DOWN: 0
4. FEELING TIRED OR HAVING LITTLE ENERGY: 2
8. MOVING OR SPEAKING SO SLOWLY THAT OTHER PEOPLE COULD HAVE NOTICED. OR THE OPPOSITE, BEING SO FIGETY OR RESTLESS THAT YOU HAVE BEEN MOVING AROUND A LOT MORE THAN USUAL: 0
SUM OF ALL RESPONSES TO PHQ QUESTIONS 1-9: 5
9. THOUGHTS THAT YOU WOULD BE BETTER OFF DEAD, OR OF HURTING YOURSELF: 0
3. TROUBLE FALLING OR STAYING ASLEEP: 0
SUM OF ALL RESPONSES TO PHQ9 QUESTIONS 1 & 2: 3
SUM OF ALL RESPONSES TO PHQ QUESTIONS 1-9: 5

## 2024-01-29 NOTE — PROGRESS NOTES
[facility]  Family Medicine      Patient:  Chanda Ascencio 63 y.o. female  Date of Service: 1/29/24      Chief complaint:   Chief Complaint   Patient presents with    New Patient    Cough     X2weeks , get worse as the day progresses          History of Present Illness   Chanda Ascencio is a 63 y.o. female who presents to the clinic with complaints as above.    Establish Care with New Physician  Medical History discussed and updated in chart  Surgical History discussed and updated in chart  Social History discussed and updated in chart  Medications reviewed and updated in chart as appropriate  Colonoscopy about a year ago, normal  Declines breast cancer screening, no family hx of breast cancer  See below for Acute/Chronic conditions addressed today     Cough  For about 2 weeks  Worse in the evening, but all day long  +fatigue  Sometimes productive in the evenings  Tried some OTC cough meds, mucinex  Denies fever, chills, chest pain, shortness of breath, abdominal pain, nausea, vomiting, diarrhea, numbness, tingling, loss of bowel or bladder control     Current Health Maintenance:  Health Maintenance   Topic Date Due    COVID-19 Vaccine (1) Never done    Depression Screen  Never done    HIV screen  Never done    Hepatitis C screen  Never done    Cervical cancer screen  Never done    Diabetes screen  Never done    Lipids  Never done    Colorectal Cancer Screen  Never done    Breast cancer screen  Never done    Shingles vaccine (1 of 2) Never done    DEXA (modify frequency per FRAX score)  Never done    Respiratory Syncytial Virus (RSV) Pregnant or age 60 yrs+ (1 - 1-dose 60+ series) Never done    Pneumococcal 65+ years Vaccine (1 - PCV) Never done    Flu vaccine (1) Never done    DTaP/Tdap/Td vaccine (2 - Td or Tdap) 11/25/2023    Hepatitis A vaccine  Aged Out    Hepatitis B vaccine  Aged Out    Hib vaccine  Aged Out    Polio vaccine  Aged Out    Meningococcal (ACWY) vaccine  Aged Out       Past

## 2024-02-02 ENCOUNTER — TELEPHONE (OUTPATIENT)
Dept: PRIMARY CARE CLINIC | Age: 64
End: 2024-02-02

## 2024-02-02 RX ORDER — PREDNISONE 20 MG/1
40 TABLET ORAL DAILY
Qty: 10 TABLET | Refills: 0 | Status: SHIPPED | OUTPATIENT
Start: 2024-02-02 | End: 2024-02-07

## 2024-02-02 NOTE — TELEPHONE ENCOUNTER
Last Appointment   1/29/2024  Next Appointment  7/29/2024    Pt called and states her cough isn't gone yet it is better but still there.  Pt states that you would send in a steroid if it didn't go away.     Please advise

## 2024-02-19 ENCOUNTER — TELEPHONE (OUTPATIENT)
Dept: PRIMARY CARE CLINIC | Age: 64
End: 2024-02-19

## 2024-02-19 ENCOUNTER — OFFICE VISIT (OUTPATIENT)
Dept: PRIMARY CARE CLINIC | Age: 64
End: 2024-02-19
Payer: COMMERCIAL

## 2024-02-19 VITALS
OXYGEN SATURATION: 96 % | SYSTOLIC BLOOD PRESSURE: 131 MMHG | HEART RATE: 96 BPM | WEIGHT: 152.6 LBS | TEMPERATURE: 97.5 F | DIASTOLIC BLOOD PRESSURE: 82 MMHG | HEIGHT: 65 IN | BODY MASS INDEX: 25.43 KG/M2

## 2024-02-19 DIAGNOSIS — R05.1 ACUTE COUGH: ICD-10-CM

## 2024-02-19 DIAGNOSIS — B64: Primary | ICD-10-CM

## 2024-02-19 PROCEDURE — 1123F ACP DISCUSS/DSCN MKR DOCD: CPT | Performed by: FAMILY MEDICINE

## 2024-02-19 PROCEDURE — 99214 OFFICE O/P EST MOD 30 MIN: CPT | Performed by: FAMILY MEDICINE

## 2024-02-19 RX ORDER — AMOXICILLIN AND CLAVULANATE POTASSIUM 875; 125 MG/1; MG/1
1 TABLET, FILM COATED ORAL 2 TIMES DAILY
Qty: 10 TABLET | Refills: 0 | Status: SHIPPED | OUTPATIENT
Start: 2024-02-19 | End: 2024-02-24

## 2024-02-19 RX ORDER — NITAZOXANIDE 500 MG/1
500 TABLET ORAL 2 TIMES DAILY WITH MEALS
Qty: 6 TABLET | Refills: 0 | Status: SHIPPED | OUTPATIENT
Start: 2024-02-19 | End: 2024-02-22

## 2024-02-19 RX ORDER — IPRATROPIUM BROMIDE 42 UG/1
2 SPRAY, METERED NASAL 4 TIMES DAILY
Qty: 15 ML | Refills: 1 | Status: SHIPPED | OUTPATIENT
Start: 2024-02-19

## 2024-02-19 NOTE — PROGRESS NOTES
[facility]  Family Medicine      Patient:  Chanda Ascencio 63 y.o. female  Date of Service: 2/19/24      Chief complaint:   Chief Complaint   Patient presents with    Cough     mucous    Results         History of Present Illness   Chanda Ascencio is a 63 y.o. female who presents to the clinic with complaints as above.    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  Results reviewed with patient, who brought them in today  Will prescribe suggested treatment today    Cough  Sore throat, mucus, fatigue  For about 1.5 weeks  Possible sick contacts  Ill on and off since Ethan  Denies fever, chills, chest pain, shortness of breath, abdominal pain, nausea, vomiting, diarrhea, numbness, tingling, loss of bowel or bladder control     Current Health Maintenance:  Health Maintenance   Topic Date Due    COVID-19 Vaccine (1) Never done    HIV screen  Never done    Hepatitis C screen  Never done    Cervical cancer screen  Never done    Diabetes screen  Never done    Lipids  Never done    Colorectal Cancer Screen  Never done    Breast cancer screen  Never done    Shingles vaccine (1 of 2) Never done    DEXA (modify frequency per FRAX score)  Never done    Respiratory Syncytial Virus (RSV) Pregnant or age 60 yrs+ (1 - 1-dose 60+ series) Never done    Pneumococcal 65+ years Vaccine (1 - PCV) Never done    Flu vaccine (1) Never done    DTaP/Tdap/Td vaccine (2 - Td or Tdap) 11/25/2023    Depression Screen  01/29/2025    Hepatitis A vaccine  Aged Out    Hepatitis B vaccine  Aged Out    Hib vaccine  Aged Out    Polio vaccine  Aged Out    Meningococcal (ACWY) vaccine  Aged Out       Past Medical History:      Diagnosis Date    Anxiety     Closed fracture of lateral portion of right tibial plateau 05/31/2022    Depression     Mixed hyperlipidemia 01/29/2024    Wheat allergy

## 2024-02-19 NOTE — TELEPHONE ENCOUNTER
Last Appointment   2/19/2024  Next Appointment  7/29/2024    Pt's Pharmacy called and states that the medication Nitazoanide is 700.00 and was wondering if there was anything else the pt could take.  Through [employer] it is still 100 and some dollars.      Please advise

## 2024-02-21 RX ORDER — METRONIDAZOLE 500 MG/1
500 TABLET ORAL 3 TIMES DAILY
Qty: 15 TABLET | Refills: 0 | Status: SHIPPED | OUTPATIENT
Start: 2024-02-21 | End: 2024-02-26

## 2024-03-07 DIAGNOSIS — Z12.31 ENCOUNTER FOR SCREENING MAMMOGRAM FOR MALIGNANT NEOPLASM OF BREAST: Primary | ICD-10-CM

## 2025-01-07 ENCOUNTER — TELEPHONE (OUTPATIENT)
Dept: ORTHOPEDIC SURGERY | Age: 65
End: 2025-01-07

## 2025-01-07 ENCOUNTER — APPOINTMENT (OUTPATIENT)
Dept: GENERAL RADIOLOGY | Age: 65
End: 2025-01-07
Payer: COMMERCIAL

## 2025-01-07 ENCOUNTER — HOSPITAL ENCOUNTER (EMERGENCY)
Age: 65
Discharge: HOME | End: 2025-01-07
Payer: COMMERCIAL

## 2025-01-07 VITALS
SYSTOLIC BLOOD PRESSURE: 146 MMHG | DIASTOLIC BLOOD PRESSURE: 88 MMHG | OXYGEN SATURATION: 100 % | WEIGHT: 155 LBS | BODY MASS INDEX: 25.79 KG/M2 | HEART RATE: 83 BPM | RESPIRATION RATE: 18 BRPM | TEMPERATURE: 98.3 F

## 2025-01-07 DIAGNOSIS — S86.911A STRAIN OF RIGHT KNEE, INITIAL ENCOUNTER: Primary | ICD-10-CM

## 2025-01-07 PROCEDURE — 73560 X-RAY EXAM OF KNEE 1 OR 2: CPT

## 2025-01-07 PROCEDURE — 99211 OFF/OP EST MAY X REQ PHY/QHP: CPT

## 2025-01-07 ASSESSMENT — PAIN DESCRIPTION - LOCATION: LOCATION: KNEE

## 2025-01-07 ASSESSMENT — PAIN DESCRIPTION - FREQUENCY: FREQUENCY: INTERMITTENT

## 2025-01-07 ASSESSMENT — PAIN - FUNCTIONAL ASSESSMENT: PAIN_FUNCTIONAL_ASSESSMENT: 0-10

## 2025-01-07 ASSESSMENT — PAIN DESCRIPTION - ORIENTATION: ORIENTATION: RIGHT

## 2025-01-07 ASSESSMENT — PAIN SCALES - GENERAL: PAINLEVEL_OUTOF10: 6

## 2025-01-07 ASSESSMENT — PAIN DESCRIPTION - DESCRIPTORS: DESCRIPTORS: ACHING;DISCOMFORT;SHARP

## 2025-01-07 NOTE — TELEPHONE ENCOUNTER
Routine scheduling, but if she is having severe pain that is acute, recommend going to urgent/walk in care or ED for imaging

## 2025-01-07 NOTE — TELEPHONE ENCOUNTER
Pt advised to go to ER or Urgent was advised to go to a [facility] in order for providers to review x-rays.  Pt states she will probably go tomorrow.  I will keep following to forward info to providers when x-rays are ready.

## 2025-01-07 NOTE — ED PROVIDER NOTES
timeframe recommended.  I explained reasons for the patient to return to the Emergency Department. Additional verbal discharge instructions were also given and discussed with the patient to supplement those generated by the EMR. We also discussed medications that were prescribed (if any) including common side effects and interactions. The patient was advised to abstain from driving, operating heavy machinery or making significant decisions while taking medications such as opiates and muscle relaxers that may impair this. All questions were addressed.  They understand return precautions and discharge instructions. The patient and/or family/friend/caregiver expressed understanding. Vitals were stable and they were in no distress at discharge. Findings were discussed with the patient and reasons to immediately return to the ED were articulated to them.     I used an evidence-based tool along with my training and experience to weigh the risk of discharge against the risks of further testing, imaging, or hospitalization. At this time, I estimate the risks of additional testing, imaging, or hospitalization to be equal to or greater than the risk of discharge.  I discussed my risk assessment with the patient and the patient consents to the risk of discharge as well as the risk of uncertainty in estimating outcomes. At this time, the risk of acute decompensation with death before the patient can return for re-evaluation is most likely lower than the risk of death attributable to being in the hospital.     Plan of Care/Counseling:  Haja Chavez NP reviewed today's visit with the patient in addition to providing specific details for the plan of care and counseling regarding the diagnosis and prognosis.  Questions are answered at this time and are agreeable with the plan.    ASSESSMENT     1. Strain of right knee, initial encounter        DISPOSITION   Discharged home.  Patient condition is stable.    Discharge 
Yes

## 2025-01-07 NOTE — TELEPHONE ENCOUNTER
PT having pain and is unable to ambulate. Would like to come in see DR. TEMPLETON 9/22 dx Closed fracture of lateral portion of right tibial plateau with routine healing, subsequent encounter .  Please advise when to schedule.

## 2025-01-08 NOTE — TELEPHONE ENCOUNTER
I agree with radiology impression:    IMPRESSION:  1. No acute bony abnormalities.  2. Suspect trace joint effusion.  3. Orthopedic hardware in the proximal tibia is intact.    Can see her next available. Could be post-traumatic OA vs internal derangement

## 2025-01-14 ENCOUNTER — OFFICE VISIT (OUTPATIENT)
Dept: ORTHOPEDIC SURGERY | Age: 65
End: 2025-01-14
Payer: COMMERCIAL

## 2025-01-14 DIAGNOSIS — S82.141D CLOSED FRACTURE OF RIGHT TIBIAL PLATEAU WITH ROUTINE HEALING, SUBSEQUENT ENCOUNTER: Primary | ICD-10-CM

## 2025-01-14 PROCEDURE — 20610 DRAIN/INJ JOINT/BURSA W/O US: CPT | Performed by: ORTHOPAEDIC SURGERY

## 2025-01-14 PROCEDURE — 1123F ACP DISCUSS/DSCN MKR DOCD: CPT | Performed by: ORTHOPAEDIC SURGERY

## 2025-01-14 PROCEDURE — 99213 OFFICE O/P EST LOW 20 MIN: CPT | Performed by: ORTHOPAEDIC SURGERY

## 2025-01-14 RX ORDER — LIDOCAINE HYDROCHLORIDE 10 MG/ML
3 INJECTION, SOLUTION INFILTRATION; PERINEURAL ONCE
Status: COMPLETED | OUTPATIENT
Start: 2025-01-14 | End: 2025-01-14

## 2025-01-14 RX ORDER — BUPIVACAINE HYDROCHLORIDE 2.5 MG/ML
3 INJECTION, SOLUTION INFILTRATION; PERINEURAL ONCE
Status: COMPLETED | OUTPATIENT
Start: 2025-01-14 | End: 2025-01-14

## 2025-01-14 RX ORDER — TRIAMCINOLONE ACETONIDE 40 MG/ML
40 INJECTION, SUSPENSION INTRA-ARTICULAR; INTRAMUSCULAR ONCE
Status: COMPLETED | OUTPATIENT
Start: 2025-01-14 | End: 2025-01-14

## 2025-01-14 RX ADMIN — LIDOCAINE HYDROCHLORIDE 3 ML: 10 INJECTION, SOLUTION INFILTRATION; PERINEURAL at 09:58

## 2025-01-14 RX ADMIN — BUPIVACAINE HYDROCHLORIDE 7.5 MG: 2.5 INJECTION, SOLUTION INFILTRATION; PERINEURAL at 09:57

## 2025-01-14 RX ADMIN — TRIAMCINOLONE ACETONIDE 40 MG: 40 INJECTION, SUSPENSION INTRA-ARTICULAR; INTRAMUSCULAR at 09:58

## 2025-01-14 NOTE — PROGRESS NOTES
[facility] NOTE    Chief Complaint   Patient presents with    Follow-up      FOLLOW UP KNEE - LOV 9/22 SX 6/1/22 RIGHT TIBIAL PLATEAU FRACTURE  OPEN REDUCTION INTERNAL FIXATION, WITH BONE ALLOGRAFT, SOFT TISSUE REPAIRS. Patient states no pain has pain when walking       OP:SURGEON: Dr. Haja Moon, DO  DATE OF PROCEDURE: 6-1-22  PROCEDURE: 1. Right lateral tibial plateau fracture open reduction with internal fixation, bone allograft application   2. Right lateral thigh hematoma incision irrigation debridement        Subjective:  Chanda Ascencio is here for evaluation for her right knee.  Patient did have a right lateral tibial plateau fracture ORIF back in June 2022 which she has done well from.  States for the past 3 weeks she has been having some increasing pain to the right knee.  States it is most notable when she is weightbearing.  Was seen at an urgent care, imaging was obtained there is no obvious fractures or new injuries.  Patient denies any trauma or inciting event to the knee.  Feels that she has had some further swelling over the anterolateral knee near her hardware which has been present for the past few months as well.  Denies any recent illnesses or infections.  Denies any instability or giving out of the knee.  Denies any mechanical symptoms such as catching or locking of the knee.  Has tried to take ibuprofen with only mild improvement.  States she has tried to rest the knee for the past couple weeks and it is feeling better but is fearful when she tries to walk her property which she does quite frequently even in the cold that she started having her symptoms recur.       Review of Systems -  All pertinent positives/negative in HPI     Objective:    General: Alert and oriented X 3, normocephalic atraumatic, external ears and eye normal, sclera clear, no acute distress, respirations easy and unlabored with no audible wheezes, skin warm and dry, speech and dress appropriate for noted age, affect

## (undated) DEVICE — SOLUTION IRRIG 3000ML 0.9% SOD CHL USP UROMATIC PLAS CONT

## (undated) DEVICE — BIT DRL L180MM DIA2.5MM QUIK CPL NONRADIOPAQUE W/O STP

## (undated) DEVICE — BANDAGE COMPR W4INXL10YD WHITE/BEIGE E MTRX HK LOOP CLSR

## (undated) DEVICE — CLAMP TRANSDUCER POLE MT HLD DT PRECEPTOR PLAS

## (undated) DEVICE — SUTURE FIBERWIRE SZ 2 W/ TAPERED NEEDLE BLUE L38IN NONABSORB BLU L26.5MM 1/2 CIRCLE AR7200

## (undated) DEVICE — GLOVE ORTHO 8   MSG9480

## (undated) DEVICE — 1000 S-DRAPE TOWEL DRAPE 10/BX: Brand: STERI-DRAPE™

## (undated) DEVICE — SET IRR L100IN DIA0.280IN C100ML 2 NVENT PIERCING PINS 3

## (undated) DEVICE — DRESSING COMP W4XL4IN N ADH PD W2.5XL2.5IN GZ BORDERED ADH

## (undated) DEVICE — PADDING CAST W6INXL4YD COT LO LINTING WYTEX

## (undated) DEVICE — DUAL HOSE W/CPC CONNECTORS: Brand: A.T.S.® TOURNIQUET SYSTEM

## (undated) DEVICE — DRESSING HYDROFIBER AQUACEL AG ADVANTAGE 3.5X6 IN

## (undated) DEVICE — GLOVE ORANGE PI 8   MSG9080

## (undated) DEVICE — GLOVE ORANGE PI 7   MSG9070

## (undated) DEVICE — SCREW BNE L80MM DIA3.5MM CORT S STL ST FULL THRD HEX RECESS
Type: IMPLANTABLE DEVICE | Site: TIBIA | Status: NON-FUNCTIONAL
Removed: 2022-06-01

## (undated) DEVICE — 3M™ IOBAN™ 2 ANTIMICROBIAL INCISE DRAPE 6650EZ: Brand: IOBAN™ 2

## (undated) DEVICE — PADDING,UNDERCAST,COTTON, 4"X4YD STERILE: Brand: MEDLINE

## (undated) DEVICE — TUBING SUCT 12FR MAL ALUM SHFT FN CAP VENT UNIV CONN W/ OBT

## (undated) DEVICE — DRESSING,GAUZE,XEROFORM,CURAD,5"X9",ST: Brand: CURAD

## (undated) DEVICE — SOLUTION IV IRRIG 1000ML POUR BTL 2F7114

## (undated) DEVICE — BIT DRL L110MM DIA2.5MM ST G QUIK CPL NONRADIOPAQUE W/O STP

## (undated) DEVICE — BIT DRL L200MM DIA2.8MM CALIB L100MM FOR 3.5MM VA LCP PROX

## (undated) DEVICE — TUBING, SUCTION, 3/16" X 12', STRAIGHT: Brand: MEDLINE

## (undated) DEVICE — ZIMMER® STERILE DISPOSABLE TOURNIQUET CUFF WITH PROTECTIVE SLEEVE AND PLC, DUAL PORT, SINGLE BLADDER, 34 IN. (86 CM)

## (undated) DEVICE — GLOVE ORANGE PI 7 1/2   MSG9075

## (undated) DEVICE — BANDAGE,GAUZE,4.5"X4.1YD,STERILE,LF: Brand: MEDLINE

## (undated) DEVICE — SOLUTION IV IRRIG POUR BRL 0.9% SODIUM CHL 2F7124

## (undated) DEVICE — GOWN,AURORA,BRTHSLV,2XL,18/CS: Brand: MEDLINE

## (undated) DEVICE — GOWN,SIRUS,POLYRNF,BRTHSLV,XL,30/CS: Brand: MEDLINE

## (undated) DEVICE — Device

## (undated) DEVICE — BIT DRL L112MM DIA3.5MM ST QUIK CPL NONRADIOPAQUE W/O STP

## (undated) DEVICE — IMMOBILIZER KNEE L20IN AD 1 SZ FIT MOST UNIV WRP ARND OPN

## (undated) DEVICE — LOWER EXT KNEE DRAPE: Brand: MEDLINE INDUSTRIES, INC.